# Patient Record
Sex: MALE | Race: WHITE | Employment: UNEMPLOYED | URBAN - METROPOLITAN AREA
[De-identification: names, ages, dates, MRNs, and addresses within clinical notes are randomized per-mention and may not be internally consistent; named-entity substitution may affect disease eponyms.]

---

## 2024-01-16 ENCOUNTER — HOSPITAL ENCOUNTER (INPATIENT)
Facility: HOSPITAL | Age: 39
LOS: 2 days | Discharge: HOME | DRG: 184 | End: 2024-01-18
Attending: SURGERY | Admitting: SURGERY
Payer: COMMERCIAL

## 2024-01-16 ENCOUNTER — APPOINTMENT (OUTPATIENT)
Dept: RADIOLOGY | Facility: HOSPITAL | Age: 39
End: 2024-01-16

## 2024-01-16 ENCOUNTER — APPOINTMENT (INPATIENT)
Dept: RADIOLOGY | Facility: HOSPITAL | Age: 39
DRG: 184 | End: 2024-01-16
Payer: COMMERCIAL

## 2024-01-16 ENCOUNTER — APPOINTMENT (EMERGENCY)
Dept: CT IMAGING | Facility: HOSPITAL | Age: 39
DRG: 184 | End: 2024-01-16
Payer: COMMERCIAL

## 2024-01-16 ENCOUNTER — APPOINTMENT (EMERGENCY)
Dept: RADIOLOGY | Facility: HOSPITAL | Age: 39
DRG: 184 | End: 2024-01-16
Payer: COMMERCIAL
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
## 2024-01-16 DIAGNOSIS — S69.92XA LEFT WRIST INJURY, INITIAL ENCOUNTER: ICD-10-CM

## 2024-01-16 DIAGNOSIS — S49.92XA INJURY OF LEFT SHOULDER, INITIAL ENCOUNTER: ICD-10-CM

## 2024-01-16 DIAGNOSIS — F10.10 ALCOHOL ABUSE: ICD-10-CM

## 2024-01-16 DIAGNOSIS — S02.5XXA BROKEN TEETH: ICD-10-CM

## 2024-01-16 PROBLEM — S27.321A RIGHT PULMONARY CONTUSION: Status: ACTIVE | Noted: 2024-01-16

## 2024-01-16 PROBLEM — E87.20 LACTIC ACIDOSIS: Status: ACTIVE | Noted: 2024-01-16

## 2024-01-16 PROBLEM — S22.41XA CLOSED FRACTURE OF MULTIPLE RIBS OF RIGHT SIDE: Status: ACTIVE | Noted: 2024-01-16

## 2024-01-16 LAB
2HR DELTA HS TROPONIN: -12 NG/L
ABO GROUP BLD: NORMAL
ABO GROUP BLD: NORMAL
ANION GAP SERPL CALCULATED.3IONS-SCNC: 21 MMOL/L
APAP SERPL-MCNC: <2 UG/ML (ref 10–20)
BASE EXCESS BLDA CALC-SCNC: -2 MMOL/L (ref -2–3)
BASOPHILS # BLD AUTO: 0.08 THOUSANDS/ÂΜL (ref 0–0.1)
BASOPHILS NFR BLD AUTO: 1 % (ref 0–1)
BLD GP AB SCN SERPL QL: NEGATIVE
BUN SERPL-MCNC: 10 MG/DL (ref 5–25)
CA-I BLD-SCNC: 1.09 MMOL/L (ref 1.12–1.32)
CALCIUM SERPL-MCNC: 9.6 MG/DL (ref 8.4–10.2)
CARDIAC TROPONIN I PNL SERPL HS: 10 NG/L
CARDIAC TROPONIN I PNL SERPL HS: 22 NG/L
CHLORIDE SERPL-SCNC: 96 MMOL/L (ref 96–108)
CO2 SERPL-SCNC: 19 MMOL/L (ref 21–32)
CREAT SERPL-MCNC: 0.84 MG/DL (ref 0.6–1.3)
EOSINOPHIL # BLD AUTO: 0.06 THOUSAND/ÂΜL (ref 0–0.61)
EOSINOPHIL NFR BLD AUTO: 1 % (ref 0–6)
ERYTHROCYTE [DISTWIDTH] IN BLOOD BY AUTOMATED COUNT: 12.6 % (ref 11.6–15.1)
ETHANOL SERPL-MCNC: <10 MG/DL
GFR SERPL CREATININE-BSD FRML MDRD: 110 ML/MIN/1.73SQ M
GLUCOSE SERPL-MCNC: 137 MG/DL (ref 65–140)
GLUCOSE SERPL-MCNC: 141 MG/DL (ref 65–140)
HCO3 BLDA-SCNC: 21.6 MMOL/L (ref 24–30)
HCT VFR BLD AUTO: 42.4 % (ref 36.5–49.3)
HCT VFR BLD CALC: 45 % (ref 36.5–49.3)
HGB BLD-MCNC: 14.6 G/DL (ref 12–17)
HGB BLDA-MCNC: 15.3 G/DL (ref 12–17)
IMM GRANULOCYTES # BLD AUTO: 0.18 THOUSAND/UL (ref 0–0.2)
IMM GRANULOCYTES NFR BLD AUTO: 2 % (ref 0–2)
LACTATE SERPL-SCNC: 1.2 MMOL/L (ref 0.5–2)
LACTATE SERPL-SCNC: 8.5 MMOL/L (ref 0.5–2)
LYMPHOCYTES # BLD AUTO: 1.23 THOUSANDS/ÂΜL (ref 0.6–4.47)
LYMPHOCYTES NFR BLD AUTO: 13 % (ref 14–44)
MCH RBC QN AUTO: 35.7 PG (ref 26.8–34.3)
MCHC RBC AUTO-ENTMCNC: 34.4 G/DL (ref 31.4–37.4)
MCV RBC AUTO: 104 FL (ref 82–98)
MONOCYTES # BLD AUTO: 0.57 THOUSAND/ÂΜL (ref 0.17–1.22)
MONOCYTES NFR BLD AUTO: 6 % (ref 4–12)
NEUTROPHILS # BLD AUTO: 7.56 THOUSANDS/ÂΜL (ref 1.85–7.62)
NEUTS SEG NFR BLD AUTO: 77 % (ref 43–75)
NRBC BLD AUTO-RTO: 0 /100 WBCS
PCO2 BLD: 23 MMOL/L (ref 21–32)
PCO2 BLD: 33.2 MM HG (ref 42–50)
PH BLD: 7.42 [PH] (ref 7.3–7.4)
PLATELET # BLD AUTO: 101 THOUSANDS/UL (ref 149–390)
PLATELET # BLD AUTO: 111 THOUSANDS/UL (ref 149–390)
PMV BLD AUTO: 10.6 FL (ref 8.9–12.7)
PMV BLD AUTO: 11.6 FL (ref 8.9–12.7)
PO2 BLD: 21 MM HG (ref 35–45)
POTASSIUM BLD-SCNC: 4.4 MMOL/L (ref 3.5–5.3)
POTASSIUM SERPL-SCNC: 4.2 MMOL/L (ref 3.5–5.3)
RBC # BLD AUTO: 4.09 MILLION/UL (ref 3.88–5.62)
RH BLD: POSITIVE
RH BLD: POSITIVE
SALICYLATES SERPL-MCNC: <5 MG/DL (ref 3–20)
SAO2 % BLD FROM PO2: 36 % (ref 60–85)
SODIUM BLD-SCNC: 136 MMOL/L (ref 136–145)
SODIUM SERPL-SCNC: 136 MMOL/L (ref 135–147)
SPECIMEN EXPIRATION DATE: NORMAL
SPECIMEN SOURCE: ABNORMAL
WBC # BLD AUTO: 9.68 THOUSAND/UL (ref 4.31–10.16)

## 2024-01-16 PROCEDURE — 85049 AUTOMATED PLATELET COUNT: CPT | Performed by: SURGERY

## 2024-01-16 PROCEDURE — G1004 CDSM NDSC: HCPCS

## 2024-01-16 PROCEDURE — 70450 CT HEAD/BRAIN W/O DYE: CPT

## 2024-01-16 PROCEDURE — 80048 BASIC METABOLIC PNL TOTAL CA: CPT | Performed by: SURGERY

## 2024-01-16 PROCEDURE — 73610 X-RAY EXAM OF ANKLE: CPT

## 2024-01-16 PROCEDURE — 71045 X-RAY EXAM CHEST 1 VIEW: CPT

## 2024-01-16 PROCEDURE — 96375 TX/PRO/DX INJ NEW DRUG ADDON: CPT

## 2024-01-16 PROCEDURE — 82330 ASSAY OF CALCIUM: CPT

## 2024-01-16 PROCEDURE — 99285 EMERGENCY DEPT VISIT HI MDM: CPT

## 2024-01-16 PROCEDURE — 71260 CT THORAX DX C+: CPT

## 2024-01-16 PROCEDURE — 84132 ASSAY OF SERUM POTASSIUM: CPT

## 2024-01-16 PROCEDURE — 73080 X-RAY EXAM OF ELBOW: CPT

## 2024-01-16 PROCEDURE — 96365 THER/PROPH/DIAG IV INF INIT: CPT

## 2024-01-16 PROCEDURE — 86900 BLOOD TYPING SEROLOGIC ABO: CPT | Performed by: SURGERY

## 2024-01-16 PROCEDURE — 36415 COLL VENOUS BLD VENIPUNCTURE: CPT | Performed by: SURGERY

## 2024-01-16 PROCEDURE — 86850 RBC ANTIBODY SCREEN: CPT | Performed by: SURGERY

## 2024-01-16 PROCEDURE — 82803 BLOOD GASES ANY COMBINATION: CPT

## 2024-01-16 PROCEDURE — 84484 ASSAY OF TROPONIN QUANT: CPT | Performed by: SURGERY

## 2024-01-16 PROCEDURE — 82947 ASSAY GLUCOSE BLOOD QUANT: CPT

## 2024-01-16 PROCEDURE — 80179 DRUG ASSAY SALICYLATE: CPT | Performed by: SURGERY

## 2024-01-16 PROCEDURE — 85025 COMPLETE CBC W/AUTO DIFF WBC: CPT | Performed by: SURGERY

## 2024-01-16 PROCEDURE — 0HQEXZZ REPAIR LEFT LOWER ARM SKIN, EXTERNAL APPROACH: ICD-10-PCS | Performed by: SURGERY

## 2024-01-16 PROCEDURE — 0HQCXZZ REPAIR LEFT UPPER ARM SKIN, EXTERNAL APPROACH: ICD-10-PCS | Performed by: SURGERY

## 2024-01-16 PROCEDURE — 73110 X-RAY EXAM OF WRIST: CPT

## 2024-01-16 PROCEDURE — 73590 X-RAY EXAM OF LOWER LEG: CPT

## 2024-01-16 PROCEDURE — 86901 BLOOD TYPING SEROLOGIC RH(D): CPT | Performed by: SURGERY

## 2024-01-16 PROCEDURE — NC001 PR NO CHARGE: Performed by: SURGERY

## 2024-01-16 PROCEDURE — 93005 ELECTROCARDIOGRAM TRACING: CPT

## 2024-01-16 PROCEDURE — 84295 ASSAY OF SERUM SODIUM: CPT

## 2024-01-16 PROCEDURE — EDAIR PR ED AIR: Performed by: EMERGENCY MEDICINE

## 2024-01-16 PROCEDURE — 82077 ASSAY SPEC XCP UR&BREATH IA: CPT | Performed by: SURGERY

## 2024-01-16 PROCEDURE — 74177 CT ABD & PELVIS W/CONTRAST: CPT

## 2024-01-16 PROCEDURE — 73200 CT UPPER EXTREMITY W/O DYE: CPT

## 2024-01-16 PROCEDURE — 72125 CT NECK SPINE W/O DYE: CPT

## 2024-01-16 PROCEDURE — 83605 ASSAY OF LACTIC ACID: CPT | Performed by: SURGERY

## 2024-01-16 PROCEDURE — 80143 DRUG ASSAY ACETAMINOPHEN: CPT | Performed by: SURGERY

## 2024-01-16 PROCEDURE — 85014 HEMATOCRIT: CPT

## 2024-01-16 RX ORDER — HYDROMORPHONE HCL/PF 1 MG/ML
0.5 SYRINGE (ML) INJECTION EVERY 4 HOURS PRN
Status: DISCONTINUED | OUTPATIENT
Start: 2024-01-16 | End: 2024-01-18

## 2024-01-16 RX ORDER — METOCLOPRAMIDE HYDROCHLORIDE 5 MG/ML
10 INJECTION INTRAMUSCULAR; INTRAVENOUS EVERY 6 HOURS PRN
Status: DISCONTINUED | OUTPATIENT
Start: 2024-01-16 | End: 2024-01-18 | Stop reason: HOSPADM

## 2024-01-16 RX ORDER — ENOXAPARIN SODIUM 100 MG/ML
30 INJECTION SUBCUTANEOUS EVERY 12 HOURS
Status: DISCONTINUED | OUTPATIENT
Start: 2024-01-16 | End: 2024-01-17

## 2024-01-16 RX ORDER — METOCLOPRAMIDE HYDROCHLORIDE 5 MG/ML
10 INJECTION INTRAMUSCULAR; INTRAVENOUS ONCE
Status: COMPLETED | OUTPATIENT
Start: 2024-01-16 | End: 2024-01-16

## 2024-01-16 RX ORDER — OXYCODONE HYDROCHLORIDE 10 MG/1
10 TABLET ORAL EVERY 4 HOURS PRN
Status: DISCONTINUED | OUTPATIENT
Start: 2024-01-16 | End: 2024-01-18 | Stop reason: HOSPADM

## 2024-01-16 RX ORDER — LANOLIN ALCOHOL/MO/W.PET/CERES
100 CREAM (GRAM) TOPICAL DAILY
Status: DISCONTINUED | OUTPATIENT
Start: 2024-01-17 | End: 2024-01-18 | Stop reason: HOSPADM

## 2024-01-16 RX ORDER — ACETAMINOPHEN 325 MG/1
975 TABLET ORAL EVERY 8 HOURS SCHEDULED
Status: DISCONTINUED | OUTPATIENT
Start: 2024-01-16 | End: 2024-01-18 | Stop reason: HOSPADM

## 2024-01-16 RX ORDER — ONDANSETRON 2 MG/ML
4 INJECTION INTRAMUSCULAR; INTRAVENOUS ONCE AS NEEDED
Status: COMPLETED | OUTPATIENT
Start: 2024-01-16 | End: 2024-01-16

## 2024-01-16 RX ORDER — METHOCARBAMOL 500 MG/1
500 TABLET, FILM COATED ORAL EVERY 6 HOURS SCHEDULED
Status: DISCONTINUED | OUTPATIENT
Start: 2024-01-16 | End: 2024-01-18 | Stop reason: HOSPADM

## 2024-01-16 RX ORDER — CEFAZOLIN SODIUM 2 G/50ML
2000 SOLUTION INTRAVENOUS EVERY 8 HOURS
Status: COMPLETED | OUTPATIENT
Start: 2024-01-17 | End: 2024-01-17

## 2024-01-16 RX ORDER — CEFAZOLIN SODIUM 2 G/50ML
2000 SOLUTION INTRAVENOUS EVERY 8 HOURS
Status: DISCONTINUED | OUTPATIENT
Start: 2024-01-16 | End: 2024-01-16 | Stop reason: SDUPTHER

## 2024-01-16 RX ORDER — CEFAZOLIN SODIUM 2 G/50ML
2000 SOLUTION INTRAVENOUS ONCE
Status: COMPLETED | OUTPATIENT
Start: 2024-01-16 | End: 2024-01-16

## 2024-01-16 RX ORDER — LIDOCAINE HYDROCHLORIDE 10 MG/ML
10 INJECTION, SOLUTION EPIDURAL; INFILTRATION; INTRACAUDAL; PERINEURAL ONCE
Status: COMPLETED | OUTPATIENT
Start: 2024-01-16 | End: 2024-01-16

## 2024-01-16 RX ORDER — OXYCODONE HYDROCHLORIDE 5 MG/1
5 TABLET ORAL EVERY 4 HOURS PRN
Status: DISCONTINUED | OUTPATIENT
Start: 2024-01-16 | End: 2024-01-18 | Stop reason: HOSPADM

## 2024-01-16 RX ORDER — FENTANYL CITRATE 50 UG/ML
50 INJECTION, SOLUTION INTRAMUSCULAR; INTRAVENOUS ONCE
Status: COMPLETED | OUTPATIENT
Start: 2024-01-16 | End: 2024-01-16

## 2024-01-16 RX ORDER — LIDOCAINE 50 MG/G
1 PATCH TOPICAL DAILY
Status: DISCONTINUED | OUTPATIENT
Start: 2024-01-16 | End: 2024-01-18 | Stop reason: HOSPADM

## 2024-01-16 RX ORDER — FOLIC ACID 1 MG/1
1 TABLET ORAL DAILY
Status: DISCONTINUED | OUTPATIENT
Start: 2024-01-17 | End: 2024-01-18 | Stop reason: HOSPADM

## 2024-01-16 RX ORDER — SODIUM CHLORIDE, SODIUM GLUCONATE, SODIUM ACETATE, POTASSIUM CHLORIDE, MAGNESIUM CHLORIDE, SODIUM PHOSPHATE, DIBASIC, AND POTASSIUM PHOSPHATE .53; .5; .37; .037; .03; .012; .00082 G/100ML; G/100ML; G/100ML; G/100ML; G/100ML; G/100ML; G/100ML
1000 INJECTION, SOLUTION INTRAVENOUS ONCE
Status: COMPLETED | OUTPATIENT
Start: 2024-01-16 | End: 2024-01-16

## 2024-01-16 RX ORDER — GABAPENTIN 100 MG/1
100 CAPSULE ORAL 3 TIMES DAILY
Status: DISCONTINUED | OUTPATIENT
Start: 2024-01-16 | End: 2024-01-18 | Stop reason: HOSPADM

## 2024-01-16 RX ORDER — CHLORHEXIDINE GLUCONATE ORAL RINSE 1.2 MG/ML
15 SOLUTION DENTAL EVERY 12 HOURS SCHEDULED
Status: DISCONTINUED | OUTPATIENT
Start: 2024-01-16 | End: 2024-01-18 | Stop reason: HOSPADM

## 2024-01-16 RX ADMIN — CEFAZOLIN SODIUM 2000 MG: 2 SOLUTION INTRAVENOUS at 18:34

## 2024-01-16 RX ADMIN — FENTANYL CITRATE 50 MCG: 50 INJECTION INTRAMUSCULAR; INTRAVENOUS at 18:34

## 2024-01-16 RX ADMIN — PHENOBARBITAL SODIUM 260 MG: 65 INJECTION INTRAMUSCULAR at 21:55

## 2024-01-16 RX ADMIN — METOCLOPRAMIDE 10 MG: 5 INJECTION, SOLUTION INTRAMUSCULAR; INTRAVENOUS at 19:11

## 2024-01-16 RX ADMIN — ONDANSETRON 4 MG: 2 INJECTION INTRAMUSCULAR; INTRAVENOUS at 18:30

## 2024-01-16 RX ADMIN — LIDOCAINE HYDROCHLORIDE 10 ML: 10 INJECTION, SOLUTION EPIDURAL; INFILTRATION; INTRACAUDAL; PERINEURAL at 21:14

## 2024-01-16 RX ADMIN — GABAPENTIN 100 MG: 100 CAPSULE ORAL at 21:04

## 2024-01-16 RX ADMIN — METOCLOPRAMIDE 10 MG: 5 INJECTION, SOLUTION INTRAMUSCULAR; INTRAVENOUS at 21:04

## 2024-01-16 RX ADMIN — LIDOCAINE 5% 1 PATCH: 700 PATCH TOPICAL at 21:06

## 2024-01-16 RX ADMIN — ACETAMINOPHEN 975 MG: 325 TABLET, FILM COATED ORAL at 21:07

## 2024-01-16 RX ADMIN — CHLORHEXIDINE GLUCONATE 15 ML: 1.2 SOLUTION ORAL at 22:42

## 2024-01-16 RX ADMIN — SODIUM CHLORIDE, SODIUM GLUCONATE, SODIUM ACETATE, POTASSIUM CHLORIDE, MAGNESIUM CHLORIDE, SODIUM PHOSPHATE, DIBASIC, AND POTASSIUM PHOSPHATE 1000 ML: .53; .5; .37; .037; .03; .012; .00082 INJECTION, SOLUTION INTRAVENOUS at 21:10

## 2024-01-16 RX ADMIN — IOHEXOL 100 ML: 350 INJECTION, SOLUTION INTRAVENOUS at 18:50

## 2024-01-16 RX ADMIN — METHOCARBAMOL TABLETS 500 MG: 500 TABLET, COATED ORAL at 21:04

## 2024-01-16 RX ADMIN — ENOXAPARIN SODIUM 30 MG: 30 INJECTION SUBCUTANEOUS at 21:03
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
## 2024-01-17 ENCOUNTER — APPOINTMENT (INPATIENT)
Dept: RADIOLOGY | Facility: HOSPITAL | Age: 39
DRG: 184 | End: 2024-01-17
Payer: COMMERCIAL

## 2024-01-17 ENCOUNTER — APPOINTMENT (INPATIENT)
Dept: CT IMAGING | Facility: HOSPITAL | Age: 39
DRG: 184 | End: 2024-01-17
Payer: COMMERCIAL

## 2024-01-17 PROBLEM — G89.11 ACUTE PAIN DUE TO TRAUMA: Status: ACTIVE | Noted: 2024-01-17

## 2024-01-17 PROBLEM — E87.20 LACTIC ACIDOSIS: Status: RESOLVED | Noted: 2024-01-16 | Resolved: 2024-01-17

## 2024-01-17 LAB
4HR DELTA HS TROPONIN: -15 NG/L
ALBUMIN SERPL BCP-MCNC: 4.2 G/DL (ref 3.5–5)
ALP SERPL-CCNC: 50 U/L (ref 34–104)
ALT SERPL W P-5'-P-CCNC: 225 U/L (ref 7–52)
ANION GAP SERPL CALCULATED.3IONS-SCNC: 11 MMOL/L
APTT PPP: 30 SECONDS (ref 23–37)
AST SERPL W P-5'-P-CCNC: 260 U/L (ref 13–39)
BASOPHILS # BLD AUTO: 0.08 THOUSANDS/ÂΜL (ref 0–0.1)
BASOPHILS NFR BLD AUTO: 1 % (ref 0–1)
BILIRUB SERPL-MCNC: 1.19 MG/DL (ref 0.2–1)
BUN SERPL-MCNC: 10 MG/DL (ref 5–25)
CA-I BLD-SCNC: 1.04 MMOL/L (ref 1.12–1.32)
CALCIUM SERPL-MCNC: 8.4 MG/DL (ref 8.4–10.2)
CARDIAC TROPONIN I PNL SERPL HS: 7 NG/L
CHLORIDE SERPL-SCNC: 99 MMOL/L (ref 96–108)
CK SERPL-CCNC: 649 U/L (ref 39–308)
CO2 SERPL-SCNC: 25 MMOL/L (ref 21–32)
CREAT SERPL-MCNC: 0.71 MG/DL (ref 0.6–1.3)
EOSINOPHIL # BLD AUTO: 0 THOUSAND/ÂΜL (ref 0–0.61)
EOSINOPHIL NFR BLD AUTO: 0 % (ref 0–6)
ERYTHROCYTE [DISTWIDTH] IN BLOOD BY AUTOMATED COUNT: 12.4 % (ref 11.6–15.1)
GFR SERPL CREATININE-BSD FRML MDRD: 118 ML/MIN/1.73SQ M
GLUCOSE SERPL-MCNC: 93 MG/DL (ref 65–140)
HCT VFR BLD AUTO: 35 % (ref 36.5–49.3)
HGB BLD-MCNC: 12 G/DL (ref 12–17)
IMM GRANULOCYTES # BLD AUTO: 0.06 THOUSAND/UL (ref 0–0.2)
IMM GRANULOCYTES NFR BLD AUTO: 0 % (ref 0–2)
INR PPP: 1.13 (ref 0.84–1.19)
LYMPHOCYTES # BLD AUTO: 1.07 THOUSANDS/ÂΜL (ref 0.6–4.47)
LYMPHOCYTES NFR BLD AUTO: 8 % (ref 14–44)
MAGNESIUM SERPL-MCNC: 1.9 MG/DL (ref 1.9–2.7)
MCH RBC QN AUTO: 35.1 PG (ref 26.8–34.3)
MCHC RBC AUTO-ENTMCNC: 34.3 G/DL (ref 31.4–37.4)
MCV RBC AUTO: 102 FL (ref 82–98)
MONOCYTES # BLD AUTO: 1.32 THOUSAND/ÂΜL (ref 0.17–1.22)
MONOCYTES NFR BLD AUTO: 10 % (ref 4–12)
NEUTROPHILS # BLD AUTO: 10.84 THOUSANDS/ÂΜL (ref 1.85–7.62)
NEUTS SEG NFR BLD AUTO: 81 % (ref 43–75)
NRBC BLD AUTO-RTO: 0 /100 WBCS
PHOSPHATE SERPL-MCNC: 3.7 MG/DL (ref 2.7–4.5)
PLATELET # BLD AUTO: 97 THOUSANDS/UL (ref 149–390)
PLATELET BLD QL SMEAR: ABNORMAL
PMV BLD AUTO: 10.8 FL (ref 8.9–12.7)
POTASSIUM SERPL-SCNC: 3.4 MMOL/L (ref 3.5–5.3)
PROT SERPL-MCNC: 6.8 G/DL (ref 6.4–8.4)
PROTHROMBIN TIME: 15.2 SECONDS (ref 11.6–14.5)
RBC # BLD AUTO: 3.42 MILLION/UL (ref 3.88–5.62)
RBC MORPH BLD: NORMAL
SODIUM SERPL-SCNC: 135 MMOL/L (ref 135–147)
WBC # BLD AUTO: 13.37 THOUSAND/UL (ref 4.31–10.16)

## 2024-01-17 PROCEDURE — 97116 GAIT TRAINING THERAPY: CPT

## 2024-01-17 PROCEDURE — 97163 PT EVAL HIGH COMPLEX 45 MIN: CPT

## 2024-01-17 PROCEDURE — 85730 THROMBOPLASTIN TIME PARTIAL: CPT | Performed by: SURGERY

## 2024-01-17 PROCEDURE — G1004 CDSM NDSC: HCPCS

## 2024-01-17 PROCEDURE — 83735 ASSAY OF MAGNESIUM: CPT | Performed by: SURGERY

## 2024-01-17 PROCEDURE — 97760 ORTHOTIC MGMT&TRAING 1ST ENC: CPT

## 2024-01-17 PROCEDURE — 82550 ASSAY OF CK (CPK): CPT | Performed by: SURGERY

## 2024-01-17 PROCEDURE — 71045 X-RAY EXAM CHEST 1 VIEW: CPT

## 2024-01-17 PROCEDURE — 70486 CT MAXILLOFACIAL W/O DYE: CPT

## 2024-01-17 PROCEDURE — 85610 PROTHROMBIN TIME: CPT | Performed by: SURGERY

## 2024-01-17 PROCEDURE — 85025 COMPLETE CBC W/AUTO DIFF WBC: CPT | Performed by: SURGERY

## 2024-01-17 PROCEDURE — 82330 ASSAY OF CALCIUM: CPT | Performed by: SURGERY

## 2024-01-17 PROCEDURE — 92610 EVALUATE SWALLOWING FUNCTION: CPT

## 2024-01-17 PROCEDURE — 97167 OT EVAL HIGH COMPLEX 60 MIN: CPT

## 2024-01-17 PROCEDURE — 73630 X-RAY EXAM OF FOOT: CPT

## 2024-01-17 PROCEDURE — 84100 ASSAY OF PHOSPHORUS: CPT | Performed by: SURGERY

## 2024-01-17 PROCEDURE — 97129 THER IVNTJ 1ST 15 MIN: CPT

## 2024-01-17 PROCEDURE — 80053 COMPREHEN METABOLIC PANEL: CPT | Performed by: SURGERY

## 2024-01-17 RX ORDER — MAGNESIUM SULFATE HEPTAHYDRATE 40 MG/ML
2 INJECTION, SOLUTION INTRAVENOUS ONCE
Status: COMPLETED | OUTPATIENT
Start: 2024-01-17 | End: 2024-01-18

## 2024-01-17 RX ORDER — CALCIUM GLUCONATE 20 MG/ML
2 INJECTION, SOLUTION INTRAVENOUS ONCE
Status: COMPLETED | OUTPATIENT
Start: 2024-01-17 | End: 2024-01-18

## 2024-01-17 RX ORDER — POTASSIUM CHLORIDE 20 MEQ/1
40 TABLET, EXTENDED RELEASE ORAL ONCE
Status: COMPLETED | OUTPATIENT
Start: 2024-01-17 | End: 2024-01-17

## 2024-01-17 RX ORDER — ENOXAPARIN SODIUM 100 MG/ML
30 INJECTION SUBCUTANEOUS EVERY 12 HOURS SCHEDULED
Status: DISCONTINUED | OUTPATIENT
Start: 2024-01-17 | End: 2024-01-18 | Stop reason: HOSPADM

## 2024-01-17 RX ADMIN — GABAPENTIN 100 MG: 100 CAPSULE ORAL at 08:38

## 2024-01-17 RX ADMIN — ENOXAPARIN SODIUM 30 MG: 30 INJECTION SUBCUTANEOUS at 21:13

## 2024-01-17 RX ADMIN — CEFAZOLIN SODIUM 2000 MG: 2 SOLUTION INTRAVENOUS at 01:26

## 2024-01-17 RX ADMIN — POTASSIUM CHLORIDE 40 MEQ: 1500 TABLET, EXTENDED RELEASE ORAL at 08:38

## 2024-01-17 RX ADMIN — OXYCODONE HYDROCHLORIDE 10 MG: 10 TABLET ORAL at 18:48

## 2024-01-17 RX ADMIN — LIDOCAINE 5% 1 PATCH: 700 PATCH TOPICAL at 08:45

## 2024-01-17 RX ADMIN — OXYCODONE HYDROCHLORIDE 10 MG: 10 TABLET ORAL at 00:30

## 2024-01-17 RX ADMIN — GABAPENTIN 100 MG: 100 CAPSULE ORAL at 21:13

## 2024-01-17 RX ADMIN — ACETAMINOPHEN 975 MG: 325 TABLET, FILM COATED ORAL at 21:13

## 2024-01-17 RX ADMIN — HYDROMORPHONE HYDROCHLORIDE 0.5 MG: 1 INJECTION, SOLUTION INTRAMUSCULAR; INTRAVENOUS; SUBCUTANEOUS at 01:26

## 2024-01-17 RX ADMIN — AMPICILLIN AND SULBACTAM 3 G: 100; 50 INJECTION, POWDER, FOR SOLUTION INTRAVENOUS at 18:48

## 2024-01-17 RX ADMIN — OXYCODONE HYDROCHLORIDE 10 MG: 10 TABLET ORAL at 04:58

## 2024-01-17 RX ADMIN — ACETAMINOPHEN 975 MG: 325 TABLET, FILM COATED ORAL at 05:00

## 2024-01-17 RX ADMIN — METHOCARBAMOL TABLETS 500 MG: 500 TABLET, COATED ORAL at 02:27

## 2024-01-17 RX ADMIN — METHOCARBAMOL TABLETS 500 MG: 500 TABLET, COATED ORAL at 08:46

## 2024-01-17 RX ADMIN — METHOCARBAMOL TABLETS 500 MG: 500 TABLET, COATED ORAL at 21:13

## 2024-01-17 RX ADMIN — AMPICILLIN AND SULBACTAM 3 G: 100; 50 INJECTION, POWDER, FOR SOLUTION INTRAVENOUS at 13:36

## 2024-01-17 RX ADMIN — CALCIUM GLUCONATE 2 G: 20 INJECTION, SOLUTION INTRAVENOUS at 08:38

## 2024-01-17 RX ADMIN — ACETAMINOPHEN 975 MG: 325 TABLET, FILM COATED ORAL at 13:04

## 2024-01-17 RX ADMIN — MULTIPLE VITAMINS W/ MINERALS TAB 1 TABLET: TAB ORAL at 08:38

## 2024-01-17 RX ADMIN — Medication 100 MG: at 08:38

## 2024-01-17 RX ADMIN — MAGNESIUM SULFATE HEPTAHYDRATE 2 G: 40 INJECTION, SOLUTION INTRAVENOUS at 08:37

## 2024-01-17 RX ADMIN — GABAPENTIN 100 MG: 100 CAPSULE ORAL at 15:37

## 2024-01-17 RX ADMIN — FOLIC ACID 1 MG: 1 TABLET ORAL at 08:45

## 2024-01-17 RX ADMIN — METHOCARBAMOL TABLETS 500 MG: 500 TABLET, COATED ORAL at 13:04

## 2024-01-17 RX ADMIN — OXYCODONE HYDROCHLORIDE 10 MG: 10 TABLET ORAL at 08:38

## 2024-01-17 RX ADMIN — OXYCODONE HYDROCHLORIDE 10 MG: 10 TABLET ORAL at 12:40

## 2024-01-17 RX ADMIN — ENOXAPARIN SODIUM 30 MG: 30 INJECTION SUBCUTANEOUS at 11:16

## 2024-01-17 RX ADMIN — CEFAZOLIN SODIUM 2000 MG: 2 SOLUTION INTRAVENOUS at 10:29

## 2024-01-17 RX ADMIN — CHLORHEXIDINE GLUCONATE 15 ML: 1.2 SOLUTION ORAL at 21:13

## 2024-01-17 RX ADMIN — CHLORHEXIDINE GLUCONATE 15 ML: 1.2 SOLUTION ORAL at 08:38

## 2024-01-17 NOTE — PROCEDURES
Splint application    Date/Time: 1/16/2024 10:25 PM    Performed by: Wilbert Kaba PA-C  Authorized by: Wilbert Kaba PA-C  Universal Protocol:  Consent: Verbal consent obtained.  Consent given by: patient  Patient understanding: patient states understanding of the procedure being performed    Procedure details:     Laterality:  Left    Location:  Wrist    Wrist:  L wrist    Splint type:  Volar short arm    Supplies:  Cotton padding, fiberglass and elastic bandage

## 2024-01-17 NOTE — PLAN OF CARE
Problem: PAIN - ADULT  Goal: Verbalizes/displays adequate comfort level or baseline comfort level  Description: Interventions:  - Encourage patient to monitor pain and request assistance  - Assess pain using appropriate pain scale  - Administer analgesics based on type and severity of pain and evaluate response  - Implement non-pharmacological measures as appropriate and evaluate response  - Consider cultural and social influences on pain and pain management  - Notify physician/advanced practitioner if interventions unsuccessful or patient reports new pain  Outcome: Progressing     Problem: INFECTION - ADULT  Goal: Absence or prevention of progression during hospitalization  Description: INTERVENTIONS:  - Assess and monitor for signs and symptoms of infection  - Monitor lab/diagnostic results  - Monitor all insertion sites, i.e. indwelling lines, tubes, and drains  - Monitor endotracheal if appropriate and nasal secretions for changes in amount and color  - [place] appropriate cooling/warming therapies per order  - Administer medications as ordered  - Instruct and encourage patient and family to use good hand hygiene technique  - Identify and instruct in appropriate isolation precautions for identified infection/condition  Outcome: Progressing  Goal: Absence of fever/infection during neutropenic period  Description: INTERVENTIONS:  - Monitor WBC    Outcome: Progressing     Problem: SAFETY ADULT  Goal: Patient will remain free of falls  Description: INTERVENTIONS:  - Educate patient/family on patient safety including physical limitations  - Instruct patient to call for assistance with activity   - Consult OT/PT to assist with strengthening/mobility   - Keep Call bell within reach  - Keep bed low and locked with side rails adjusted as appropriate  - Keep care items and personal belongings within reach  - Initiate and maintain comfort rounds  - Apply yellow socks and bracelet for high fall risk patients  - Consider  moving patient to room near nurses station  Outcome: Progressing  Goal: Maintain or return to baseline ADL function  Description: INTERVENTIONS:  -  Assess patient's ability to carry out ADLs; assess patient's baseline for ADL function and identify physical deficits which impact ability to perform ADLs (bathing, care of mouth/teeth, toileting, grooming, dressing, etc.)  - Assess/evaluate cause of self-care deficits   - Assess range of motion  - Assess patient's mobility; develop plan if impaired  - Assess patient's need for assistive devices and provide as appropriate  - Encourage maximum independence but intervene and supervise when necessary  - Involve family in performance of ADLs  - Assess for home care needs following discharge   - Consider OT consult to assist with ADL evaluation and planning for discharge  - Provide patient education as appropriate  Outcome: Progressing  Goal: Maintains/Returns to pre admission functional level  Description: INTERVENTIONS:  - Perform AM-PAC 6 Click Basic Mobility/ Daily Activity assessment daily.  - Set and communicate daily mobility goal to care team and patient/family/caregiver.   - Collaborate with rehabilitation services on mobility goals if consulted  - Record patient progress and toleration of activity level   Outcome: Progressing     Problem: DISCHARGE PLANNING  Goal: Discharge to home or other facility with appropriate resources  Description: INTERVENTIONS:  - Identify barriers to discharge w/patient and caregiver  - Arrange for needed discharge resources and transportation as appropriate  - Identify discharge learning needs (meds, wound care, etc.)  - Arrange for interpretive services to assist at discharge as needed  - Refer to Case Management Department for coordinating discharge planning if the patient needs post-hospital services based on physician/advanced practitioner order or complex needs related to functional status, cognitive ability, or social support  system  Outcome: Progressing     Problem: Knowledge Deficit  Goal: Patient/family/caregiver demonstrates understanding of disease process, treatment plan, medications, and discharge instructions  Description: Complete learning assessment and assess knowledge base.  Interventions:  - Provide teaching at level of understanding  - Provide teaching via preferred learning methods  Outcome: Progressing
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
## 2024-01-17 NOTE — ASSESSMENT & PLAN NOTE
- Multiple right-sided rib fractures (3-5, age indeterminate 7-9), present on admission.  - Continue rib fracture protocol.  - Continue to encourage incentive spirometer use and adequate pulmonary hygiene.    - PIC score .  - Appreciate APS evaluation and recommendations.  - Continue multimodal analgesic regimen.  - Supplemental oxygen via nasal cannula as needed to maintain saturations greater than or equal to 94%.  - Repeat chest x-ray 1/17  - PT and OT evaluation and treatment as indicated.  - Outpatient follow-up in the trauma clinic for re-evaluation in approximately 2 weeks.
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
## 2024-01-17 NOTE — ASSESSMENT & PLAN NOTE
- 2g ancef given in trauma bay for possible open wrist fracture  - XR Left wrist: Unusual bone densities dorsal to the distal radius. Cannot exclude acute fracture   - CT LUE: Small bony fragments immediately dorsal to the distal radius attributed to age-indeterminate displaced fracture   - Appreciate Orthopedics consult and recommendations  - Nonoperative  - Laceration washed out and repaired at bedside, volar splint placed  - NWB LUE in splint  - Complete open fx protocol with ancef 2g Q8 hrs x 3 doses   - Neurovascularly intact  - Multimodal pain regimen  - PT and OT

## 2024-01-17 NOTE — PROCEDURES
Universal Protocol:  Consent: Verbal consent obtained.  Consent given by: patient  Patient understanding: patient states understanding of the procedure being performed  Patient identity confirmed: verbally with patient  Laceration repair    Date/Time: 1/16/2024 10:19 PM    Performed by: Wilbert Kaba PA-C  Authorized by: Wilbert Kaba PA-C  Body area: upper extremity  Location details: left wrist  Laceration length: 4 cm  Anesthesia: local infiltration    Anesthesia:  Local Anesthetic: lidocaine 1% without epinephrine  Anesthetic total: 5 mL    Wound Dehiscence:  Superficial Wound Dehiscence: simple closure      Procedure Details:  Preparation: Patient was prepped and draped in the usual sterile fashion.  Irrigation solution: saline  Irrigation method: jet lavage  Amount of cleaning: extensive (2L sterile water bedside washout)  Wound skin closure material used: 2-0 Prolene.  Number of sutures: 5  Technique: simple  Approximation: close  Approximation difficulty: simple  Patient tolerance: patient tolerated the procedure well with no immediate complications  Comments: Xeroform, white gauze, and volar splint
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
## 2024-01-17 NOTE — ASSESSMENT & PLAN NOTE
- 2g ancef given in trauma bay for possible open wrist fracture  - XR Left wrist: Unusual bone densities dorsal to the distal radius. Cannot exclude acute fracture   - CT LUE: Small bony fragments immediately dorsal to the distal radius attributed to age-indeterminate displaced fracture   - Appreciate Orthopedics consult and recommendations, nonoperative  - Laceration washed out and repaired at bedside, volar splint placed  - NWB LUE in splint  - Complete open fx protocol with ancef 2g Q8 hrs x 3 doses   - Neurovascularly intact  - Multimodal pain regimen  - PT and OT

## 2024-01-17 NOTE — ASSESSMENT & PLAN NOTE
- Small to moderate anterior right upper lobe and trace anterior right middle lobe pulmonary contusion.   - Repeat CXR in AM to evaluate for blossoming, pending  - Aggressive pulmonary hygiene, able to pull 1500 on IS.

## 2024-01-17 NOTE — DISCHARGE INSTR - AVS FIRST PAGE
Discharge Instructions - Orthopedics  Devin Gao 38 y.o. male MRN: [id number]  Unit/Bed#: AN CT SCAN    Weight Bearing Status:                                           Weight bearing as tolerated to the right lower extremity in camboot  Non weight bearing to the left upper extremity in volar resting splint.     Pain:  Continue analgesics as directed    Dressing Instructions:   Please keep clean, dry and intact until follow up     Appt Instructions:   If you do not have your appointment, please call the clinic at [phone] to schedule with Dr. Buckley.   Otherwise follow up as scheduled.    Contact the office sooner if you experience any increased numbness/tingling in the extremities.      Traumatic Rib Fracture Discharge Instructions:    Your rib fractures will take time to heal. Rib fractures typically take at least 6-8 weeks to heal and may take longer.    Activity:  - Walking and normal light activities are encouraged. Normal daily activities including climbing steps are okay.  - Avoid lifting greater than 10 pounds, any strenuous activities and/or exercise, and contact sports until cleared by the trauma service.  - Continue using the incentive spirometer at least 10 times every hour while awake.    Return to work:    - You may return to work once you are cleared by the trauma service.    Medications:    - You should continue your current medication regimen after discharge unless otherwise instructed. Please refer to your discharge medication list for further details.  - Please take the pain medications as directed.  - You are encouraged to use non-narcotic pain medications first and whenever possible. Reserve the use of narcotic pain medication for moderate to severe pain not controlled by non-narcotic medications.  - No driving while taking narcotic pain medications.  - You may become constipated, especially if taking pain medications. You may take any over the counter stool softeners or laxatives  as needed. Examples: Milk of Magnesia, Colace, Senna.    Additional Instructions:  - If you have any questions or concerns after discharge please call the office.  - Call office or return to ER if fever greater than 101, chills, worsening/uncontrollable pain, develop productive cough, increasing shortness of breath, and/or difficulty breathing.    Oral and Maxillofacial Surgery Discharge Instructions:

## 2024-01-17 NOTE — ASSESSMENT & PLAN NOTE
No current oxygen requirement.  Denies shortness of breath and does not appear in respiratory distress.  Able to pull greater than 2 L on incentive spirometry consistently in my presence.
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
## 2024-01-17 NOTE — ASSESSMENT & PLAN NOTE
- Right sided upper teeth are broken with associated buccal swelling, no identified lacerations in the oral mucosa  - OMS consulted, appreciate recs  - follow up CT facial bones prior to initiating unasyn. Will follow up with OMS once imaging completed.
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
## 2024-01-17 NOTE — ASSESSMENT & PLAN NOTE
- Reports he drinks 4 liquor drinks daily, last drink was 1/15  - History of alcohol withdrawal, unknown if he has seizures  - Started to have tremors/ shakiness and mild hypertension and tachycardia - gave 1 dose of phenobarbital 260 mg IV  - Monitor on [facility] protocol, SD2
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
## 2024-01-17 NOTE — PROCEDURES
Universal Protocol:  Consent: Verbal consent obtained.  Consent given by: patient  Patient understanding: patient states understanding of the procedure being performed  Patient identity confirmed: verbally with patient  Laceration repair    Date/Time: 1/16/2024 10:26 PM    Performed by: Wilbert Kaba PA-C  Authorized by: Wilbert Kaba PA-C  Body area: upper extremity  Location details: left shoulder  Laceration length: 1 cm    Anesthesia:  Local Anesthetic: lidocaine 1% without epinephrine    Wound Dehiscence:  Superficial Wound Dehiscence: simple closure      Procedure Details:  Wound skin closure material used: 2-0 prolene.  Number of sutures: 1  Patient tolerance: patient tolerated the procedure well with no immediate complications
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
## 2024-01-17 NOTE — PLAN OF CARE
Problem: PAIN - ADULT  Goal: Verbalizes/displays adequate comfort level or baseline comfort level  Description: Interventions:  - Encourage patient to monitor pain and request assistance  - Assess pain using appropriate pain scale  - Administer analgesics based on type and severity of pain and evaluate response  - Implement non-pharmacological measures as appropriate and evaluate response  - Consider cultural and social influences on pain and pain management  - Notify physician/advanced practitioner if interventions unsuccessful or patient reports new pain  Outcome: Progressing     Problem: INFECTION - ADULT  Goal: Absence or prevention of progression during hospitalization  Description: INTERVENTIONS:  - Assess and monitor for signs and symptoms of infection  - Monitor lab/diagnostic results  - Monitor all insertion sites, i.e. indwelling lines, tubes, and drains  - Monitor endotracheal if appropriate and nasal secretions for changes in amount and color  - [place] appropriate cooling/warming therapies per order  - Administer medications as ordered  - Instruct and encourage patient and family to use good hand hygiene technique  - Identify and instruct in appropriate isolation precautions for identified infection/condition  Outcome: Progressing  Goal: Absence of fever/infection during neutropenic period  Description: INTERVENTIONS:  - Monitor WBC    Outcome: Progressing     Problem: SAFETY ADULT  Goal: Patient will remain free of falls  Description: INTERVENTIONS:  - Educate patient/family on patient safety including physical limitations  - Instruct patient to call for assistance with activity   - Consult OT/PT to assist with strengthening/mobility   - Keep Call bell within reach  - Keep bed low and locked with side rails adjusted as appropriate  - Keep care items and personal belongings within reach  - Initiate and maintain comfort rounds  - Make Fall Risk Sign visible to staff  - Apply yellow socks and bracelet  for high fall risk patients  - Consider moving patient to room near nurses station  Outcome: Progressing  Goal: Maintain or return to baseline ADL function  Description: INTERVENTIONS:  -  Assess patient's ability to carry out ADLs; assess patient's baseline for ADL function and identify physical deficits which impact ability to perform ADLs (bathing, care of mouth/teeth, toileting, grooming, dressing, etc.)  - Assess/evaluate cause of self-care deficits   - Assess range of motion  - Assess patient's mobility; develop plan if impaired  - Assess patient's need for assistive devices and provide as appropriate  - Encourage maximum independence but intervene and supervise when necessary  - Involve family in performance of ADLs  - Assess for home care needs following discharge   - Consider OT consult to assist with ADL evaluation and planning for discharge  - Provide patient education as appropriate  Outcome: Progressing  Goal: Maintains/Returns to pre admission functional level  Description: INTERVENTIONS:  - Perform AM-PAC 6 Click Basic Mobility/ Daily Activity assessment daily.  - Set and communicate daily mobility goal to care team and patient/family/caregiver.   - Collaborate with rehabilitation services on mobility goals if consulted  - Out of bed for toileting  - Record patient progress and toleration of activity level   Outcome: Progressing     Problem: DISCHARGE PLANNING  Goal: Discharge to home or other facility with appropriate resources  Description: INTERVENTIONS:  - Identify barriers to discharge w/patient and caregiver  - Arrange for needed discharge resources and transportation as appropriate  - Identify discharge learning needs (meds, wound care, etc.)  - Arrange for interpretive services to assist at discharge as needed  - Refer to Case Management Department for coordinating discharge planning if the patient needs post-hospital services based on physician/advanced practitioner order or complex needs  related to functional status, cognitive ability, or social support system  Outcome: Progressing     Problem: Knowledge Deficit  Goal: Patient/family/caregiver demonstrates understanding of disease process, treatment plan, medications, and discharge instructions  Description: Complete learning assessment and assess knowledge base.  Interventions:  - Provide teaching at level of understanding  - Provide teaching via preferred learning methods  Outcome: Progressing

## 2024-01-17 NOTE — ASSESSMENT & PLAN NOTE
- Reports he drinks 4 liquor drinks daily, last drink was 1/15  - History of alcohol withdrawal, unknown if he has seizures  - thiamine, folate, multivitamin  - Started to have tremors/ shakiness and mild hypertension and tachycardia - pt has gotten phenobarbital 260 mg IV  - Monitor on [facility] protocol, SD2

## 2024-01-17 NOTE — ASSESSMENT & PLAN NOTE
- Right sided upper teeth are broken with associated buccal swelling, no identified lacerations in the oral mucosa  - OMS consulted

## 2024-01-17 NOTE — ASSESSMENT & PLAN NOTE
Tylenol 975 mg p.o. every 8 hours scheduled.  Gabapentin 100 mg p.o. 3 times daily.  Lidocaine patch, on for 12 hours and off for 12 hours.  Robaxin 500 mg p.o. every 6 hours scheduled.  Oxycodone 5 mg p.o. every 4 hours as needed moderate pain.  Oxycodone 10 mg p.o. every 4 hours.  Severe pain.  Discontinue IV Dilaudid.  Bowel regimen to avoid opioid-induced constipation while on opioid pain medication.  Ice to painful areas for up to 20 minutes every hour as needed.  No indication for epidural PCEA or peripheral nerve block at this time.  Did discuss this with patient and he is amenable should the need arise.  At discharge, suggest the following:  Tylenol 975 mg p.o. every 8 hours as needed mild pain.  Gabapentin 100 mg p.o. 3 times daily x 3 days, then 100 mg p.o. twice daily x 3 days, then 100 mg p.o. at bedtime x 3 days, then discontinue.  Lidocaine patch, on for 12 hours and off for 12 hours as needed for mild local pain.  Robaxin 500 mg p.o. every 6 hours as needed muscle spasm x 5 days.  Oxycodone 5 mg p.o. every 4 hours as needed moderate to severe pain x 3 days.  Bowel regimen while on opioid pain medication.

## 2024-01-17 NOTE — PROCEDURES
POC FAST US    Date/Time: 1/16/2024 7:08 PM    Performed by: Wilbert Kaba PA-C  Authorized by: Wilbert Kaba PA-C    Patient location:  Trauma  Procedure details:     Exam Type:  Diagnostic    Indications: blunt abdominal trauma and blunt chest trauma      Assess for:  Intra-abdominal fluid, pericardial effusion and pneumothorax    Technique: extended FAST      Views obtained:  Heart - Pericardial sac, LUQ - Splenorenal space, Suprapubic - Pouch of Mahesh, RUQ - Diana's Pouch, Left thorax and Right thorax    Image quality: diagnostic      Image availability:  Images available in PACS and video obtained  FAST Findings:     RUQ (Hepatorenal) free fluid: absent      LUQ (Splenorenal) free fluid: absent      Suprapubic free fluid: absent      Cardiac wall motion: identified      Pericardial effusion: absent    extended FAST (Pulmonary) findings:     Left lung sliding: Present      Right lung sliding: Present    Interpretation:     Impressions: negative

## 2024-01-17 NOTE — ASSESSMENT & PLAN NOTE
- Multiple right-sided rib fractures (3-5, age indeterminate 7-9), present on admission.  - Continue rib fracture protocol.  - Continue to encourage incentive spirometer use and adequate pulmonary hygiene.    - Appreciate APS evaluation and recommendations.  - Continue multimodal analgesic regimen.  - patient is tolerating incentive spirometery well, no epidural. Start chemical DVT ppx  - Supplemental oxygen via nasal cannula as needed to maintain saturations greater than or equal to 94%.  - Repeat chest x-ray 1/17  - PT and OT evaluation and treatment as indicated.  - Outpatient follow-up in the trauma clinic for re-evaluation in approximately 2 weeks.

## 2024-01-17 NOTE — ASSESSMENT & PLAN NOTE
- Small to moderate anterior right upper lobe and trace anterior right middle lobe pulmonary contusion.   - Repeat CXR in AM to evaluate for blossoming  - Aggressive pulmonary hygiene
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
## 2024-01-17 NOTE — OCCUPATIONAL THERAPY NOTE
Occupational Therapy Cancellation Note        Patient Name: Devin Gao  Today's Date: 1/17/2024 01/17/24 0942   Note Type   Note type Cancelled Session   Cancel Reasons Medical status   Additional Comments OT orders recieved, pt is currently pending imaging of foot and formal ortho plan. Will hold OT eval at this time and see s/p imaging as appropriate/able     Radha Baumann MS, OTR/L

## 2024-01-18 VITALS
SYSTOLIC BLOOD PRESSURE: 125 MMHG | TEMPERATURE: 98.9 F | OXYGEN SATURATION: 93 % | HEART RATE: 80 BPM | DIASTOLIC BLOOD PRESSURE: 78 MMHG | RESPIRATION RATE: 18 BRPM | WEIGHT: 181 LBS | BODY MASS INDEX: 25.34 KG/M2 | HEIGHT: 71 IN

## 2024-01-18 LAB
ANION GAP SERPL CALCULATED.3IONS-SCNC: 9 MMOL/L
ATRIAL RATE: 92 BPM
BUN SERPL-MCNC: 7 MG/DL (ref 5–25)
CA-I BLD-SCNC: 1.03 MMOL/L (ref 1.12–1.32)
CALCIUM SERPL-MCNC: 8.1 MG/DL (ref 8.4–10.2)
CHLORIDE SERPL-SCNC: 98 MMOL/L (ref 96–108)
CO2 SERPL-SCNC: 25 MMOL/L (ref 21–32)
CREAT SERPL-MCNC: 0.52 MG/DL (ref 0.6–1.3)
ERYTHROCYTE [DISTWIDTH] IN BLOOD BY AUTOMATED COUNT: 12.2 % (ref 11.6–15.1)
GFR SERPL CREATININE-BSD FRML MDRD: 134 ML/MIN/1.73SQ M
GLUCOSE SERPL-MCNC: 90 MG/DL (ref 65–140)
HCT VFR BLD AUTO: 30.8 % (ref 36.5–49.3)
HGB BLD-MCNC: 10.6 G/DL (ref 12–17)
MAGNESIUM SERPL-MCNC: 1.9 MG/DL (ref 1.9–2.7)
MCH RBC QN AUTO: 35.6 PG (ref 26.8–34.3)
MCHC RBC AUTO-ENTMCNC: 34.4 G/DL (ref 31.4–37.4)
MCV RBC AUTO: 103 FL (ref 82–98)
P AXIS: 53 DEGREES
PLATELET # BLD AUTO: 80 THOUSANDS/UL (ref 149–390)
PMV BLD AUTO: 11 FL (ref 8.9–12.7)
POTASSIUM SERPL-SCNC: 3.4 MMOL/L (ref 3.5–5.3)
PR INTERVAL: 130 MS
QRS AXIS: 46 DEGREES
QRSD INTERVAL: 90 MS
QT INTERVAL: 370 MS
QTC INTERVAL: 457 MS
RBC # BLD AUTO: 2.98 MILLION/UL (ref 3.88–5.62)
SODIUM SERPL-SCNC: 132 MMOL/L (ref 135–147)
T WAVE AXIS: 14 DEGREES
VENTRICULAR RATE: 92 BPM
WBC # BLD AUTO: 9.55 THOUSAND/UL (ref 4.31–10.16)

## 2024-01-18 PROCEDURE — 83735 ASSAY OF MAGNESIUM: CPT

## 2024-01-18 PROCEDURE — 99233 SBSQ HOSP IP/OBS HIGH 50: CPT | Performed by: PHYSICIAN ASSISTANT

## 2024-01-18 PROCEDURE — 82330 ASSAY OF CALCIUM: CPT

## 2024-01-18 PROCEDURE — 80048 BASIC METABOLIC PNL TOTAL CA: CPT

## 2024-01-18 PROCEDURE — 85027 COMPLETE CBC AUTOMATED: CPT

## 2024-01-18 RX ORDER — POTASSIUM CHLORIDE 20 MEQ/1
40 TABLET, EXTENDED RELEASE ORAL ONCE
Status: COMPLETED | OUTPATIENT
Start: 2024-01-18 | End: 2024-01-18

## 2024-01-18 RX ORDER — LIDOCAINE 4 G/G
1 PATCH TOPICAL DAILY
Start: 2024-01-18

## 2024-01-18 RX ORDER — CALCIUM GLUCONATE 20 MG/ML
2 INJECTION, SOLUTION INTRAVENOUS ONCE
Status: COMPLETED | OUTPATIENT
Start: 2024-01-18 | End: 2024-01-18

## 2024-01-18 RX ORDER — OXYCODONE HYDROCHLORIDE 5 MG/1
5-10 TABLET ORAL EVERY 4 HOURS PRN
Qty: 36 TABLET | Refills: 0 | Status: SHIPPED | OUTPATIENT
Start: 2024-01-18 | End: 2024-01-21

## 2024-01-18 RX ORDER — MAGNESIUM SULFATE HEPTAHYDRATE 40 MG/ML
2 INJECTION, SOLUTION INTRAVENOUS ONCE
Status: COMPLETED | OUTPATIENT
Start: 2024-01-18 | End: 2024-01-18

## 2024-01-18 RX ORDER — AMOXICILLIN 500 MG/1
500 CAPSULE ORAL EVERY 8 HOURS SCHEDULED
Qty: 18 CAPSULE | Refills: 0 | Status: SHIPPED | OUTPATIENT
Start: 2024-01-18 | End: 2024-01-24

## 2024-01-18 RX ORDER — GABAPENTIN 100 MG/1
100 CAPSULE ORAL 3 TIMES DAILY
Qty: 42 CAPSULE | Refills: 0 | Status: SHIPPED | OUTPATIENT
Start: 2024-01-18 | End: 2024-02-01

## 2024-01-18 RX ORDER — CHLORHEXIDINE GLUCONATE ORAL RINSE 1.2 MG/ML
15 SOLUTION DENTAL EVERY 12 HOURS SCHEDULED
Qty: 180 ML | Refills: 0 | Status: SHIPPED | OUTPATIENT
Start: 2024-01-18 | End: 2024-01-24

## 2024-01-18 RX ORDER — METHOCARBAMOL 500 MG/1
500 TABLET, FILM COATED ORAL EVERY 6 HOURS SCHEDULED
Qty: 56 TABLET | Refills: 0 | Status: SHIPPED | OUTPATIENT
Start: 2024-01-18 | End: 2024-02-01

## 2024-01-18 RX ORDER — ACETAMINOPHEN 325 MG/1
650 TABLET ORAL EVERY 4 HOURS PRN
Start: 2024-01-18

## 2024-01-18 RX ADMIN — Medication 100 MG: at 08:10

## 2024-01-18 RX ADMIN — OXYCODONE HYDROCHLORIDE 10 MG: 10 TABLET ORAL at 05:16

## 2024-01-18 RX ADMIN — POTASSIUM CHLORIDE 40 MEQ: 1500 TABLET, EXTENDED RELEASE ORAL at 07:42

## 2024-01-18 RX ADMIN — HYDROMORPHONE HYDROCHLORIDE 0.5 MG: 1 INJECTION, SOLUTION INTRAMUSCULAR; INTRAVENOUS; SUBCUTANEOUS at 07:42

## 2024-01-18 RX ADMIN — METHOCARBAMOL TABLETS 500 MG: 500 TABLET, COATED ORAL at 07:42

## 2024-01-18 RX ADMIN — ACETAMINOPHEN 975 MG: 325 TABLET, FILM COATED ORAL at 13:34

## 2024-01-18 RX ADMIN — MAGNESIUM SULFATE HEPTAHYDRATE 2 G: 40 INJECTION, SOLUTION INTRAVENOUS at 10:23

## 2024-01-18 RX ADMIN — FOLIC ACID 1 MG: 1 TABLET ORAL at 08:10

## 2024-01-18 RX ADMIN — GABAPENTIN 100 MG: 100 CAPSULE ORAL at 08:10

## 2024-01-18 RX ADMIN — METHOCARBAMOL TABLETS 500 MG: 500 TABLET, COATED ORAL at 13:34

## 2024-01-18 RX ADMIN — OXYCODONE HYDROCHLORIDE 10 MG: 10 TABLET ORAL at 10:26

## 2024-01-18 RX ADMIN — ENOXAPARIN SODIUM 30 MG: 30 INJECTION SUBCUTANEOUS at 08:11

## 2024-01-18 RX ADMIN — CHLORHEXIDINE GLUCONATE 15 ML: 1.2 SOLUTION ORAL at 08:10

## 2024-01-18 RX ADMIN — ACETAMINOPHEN 975 MG: 325 TABLET, FILM COATED ORAL at 05:06

## 2024-01-18 RX ADMIN — AMPICILLIN AND SULBACTAM 3 G: 100; 50 INJECTION, POWDER, FOR SOLUTION INTRAVENOUS at 01:15

## 2024-01-18 RX ADMIN — MULTIPLE VITAMINS W/ MINERALS TAB 1 TABLET: TAB ORAL at 08:10

## 2024-01-18 RX ADMIN — AMPICILLIN AND SULBACTAM 3 G: 100; 50 INJECTION, POWDER, FOR SOLUTION INTRAVENOUS at 07:46

## 2024-01-18 RX ADMIN — OXYCODONE HYDROCHLORIDE 10 MG: 10 TABLET ORAL at 01:15

## 2024-01-18 RX ADMIN — METHOCARBAMOL TABLETS 500 MG: 500 TABLET, COATED ORAL at 01:15

## 2024-01-18 RX ADMIN — LIDOCAINE 5% 1 PATCH: 700 PATCH TOPICAL at 08:10

## 2024-01-18 RX ADMIN — CALCIUM GLUCONATE 2 G: 20 INJECTION, SOLUTION INTRAVENOUS at 08:11

## 2024-01-18 NOTE — ASSESSMENT & PLAN NOTE
- Small to moderate anterior right upper lobe and trace anterior right middle lobe pulmonary contusion.   - CXR from 1/17/2024 was reviewed and stable.  - Aggressive pulmonary hygiene, able to pull 1500 on IS.

## 2024-01-18 NOTE — ASSESSMENT & PLAN NOTE
- Multiple right-sided rib fractures (3-5, age indeterminate 7-9), present on admission.  - Continue rib fracture protocol.  - Continue to encourage incentive spirometer use and adequate pulmonary hygiene.    - Appreciate APS evaluation and recommendations.  - Continue multimodal analgesic regimen.  - Repeat chest x-ray from 1/17/2024 was stable.  - PT and OT evaluation and treatment as indicated.  - Outpatient follow-up in the trauma clinic for re-evaluation in approximately 2 weeks.
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
## 2024-01-18 NOTE — ASSESSMENT & PLAN NOTE
- Right sided upper teeth are broken with associated buccal swelling, no identified lacerations in the oral mucosa  - OMS consulted; evaluation and recommendations appreciated.  - Started IV Unasyn 3g every 6 hours and patient discharged on Amoxicillin 500mg TID for total 7d course.  - Recommended outpatient follow-up with OMS.

## 2024-01-18 NOTE — ASSESSMENT & PLAN NOTE
- 2g ancef given in trauma bay for possible open wrist fracture  - XR Left wrist: Unusual bone densities dorsal to the distal radius. Cannot exclude acute fracture   - CT LUE: Small bony fragments immediately dorsal to the distal radius attributed to age-indeterminate displaced fracture   - Appreciate Orthopedics consult and recommendations, nonoperative  - Laceration washed out and repaired at bedside, volar splint placed  - NWB LUE in splint  - ancef open fracture protocol abx completed  - Neurovascularly intact  - Multimodal pain regimen  - PT and OT
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
## 2024-01-18 NOTE — ASSESSMENT & PLAN NOTE
- Reports he drinks 4 liquor drinks daily, last drink was 1/15  - History of alcohol withdrawal, unknown if he has seizures  - thiamine, folate, multivitamin  - Started to have tremors/ shakiness and mild hypertension and tachycardia - pt has gotten phenobarbital 260 mg IV  - Monitor on [facility] protocol

## 2024-01-18 NOTE — INCIDENTAL FINDINGS
The following findings require follow up:  Radiographic finding     Findings and Follow up required:       1) Liver is diffusely decreased in density consistent with fatty change. This finding was noted incidentally on your trauma imaging. No CT evidence of suspicious hepatic (liver) mass. Normal hepatic (liver) contours. A malignancy (cancer) cannot be completely excluded based on trauma imaging alone.  Recommend short-term outpatient follow-up with primary care provider to review the finding and for further surveillance as indicated.       2) One or more sharply circumscribed subcentimeter renal (kidney) hypodensities are noted incidentally on your trauma imaging. These lesions are too small to accurately characterize, but are statistically most likely to represent benign cortical renal (kidney cyst(s).  A malignancy (cancer) cannot be completely excluded based on trauma imaging alone.  Recommend short-term outpatient follow-up with primary care provider to review the finding and for further surveillance as indicated. According to the guidelines published in the White Paper of the ACR Incidental Findings Committee (Radiology 2010), no further workup of these lesions is recommended at this time.      Follow up should be done within 2 week(s)    The above noted incidental findings were discussed with the patient. The patient demonstrated understanding of the findings and the follow-up recommendations.    Please notify the following clinician to assist with the follow up:   Primary Care Provider
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
## 2024-01-18 NOTE — ASSESSMENT & PLAN NOTE
- Multiple right-sided rib fractures (3-5, age indeterminate 7-9), present on admission.  - Continue rib fracture protocol.  - Continue to encourage incentive spirometer use and adequate pulmonary hygiene.    - Appreciate APS evaluation and recommendations.  - Continue multimodal analgesic regimen.  - patient is tolerating incentive spirometery well, no epidural. Started on lovenox BID  - Supplemental oxygen via nasal cannula as needed to maintain saturations greater than or equal to 94%.  - Repeat chest x-ray 1/17 stable  - PT and OT evaluation and treatment as indicated.  - Outpatient follow-up in the trauma clinic for re-evaluation in approximately 2 weeks.

## 2024-01-18 NOTE — ASSESSMENT & PLAN NOTE
- 2g ancef given in trauma bay for possible open wrist fracture  - XR Left wrist: Unusual bone densities dorsal to the distal radius. Cannot exclude acute fracture   - CT LUE: Small bony fragments immediately dorsal to the distal radius attributed to age-indeterminate displaced fracture   - Appreciate Orthopedics consult and recommendations, nonoperative  - Laceration washed out and repaired at bedside, volar splint placed  - May be weightbearing as tolerated in the left upper extremity  in splint.  - Ancef open fracture protocol antibiotics completed.  - Neurovascularly intact  - Multimodal pain regimen  - PT and OT.

## 2024-01-18 NOTE — ASSESSMENT & PLAN NOTE
- Reports he drinks 4 liquor drinks daily, last drink was 1/15/2024.  - History of alcohol withdrawal, unknown if he has seizures  - Started to have tremors/ shakiness and mild hypertension and tachycardia - pt has gotten phenobarbital 260 mg IV.  - Monitor on [facility] protocol.

## 2024-01-18 NOTE — ASSESSMENT & PLAN NOTE
- Small to moderate anterior right upper lobe and trace anterior right middle lobe pulmonary contusion.   - CXR 1/17 AM stable  - Aggressive pulmonary hygiene, able to pull 1500 on IS.

## 2024-01-18 NOTE — ASSESSMENT & PLAN NOTE
- Small shoulder laceration repaired with 1 suture 1/16/2024 - remove in 5-7 days (1/21/2024-1/23/2024).

## 2024-01-18 NOTE — PLAN OF CARE
Problem: PAIN - ADULT  Goal: Verbalizes/displays adequate comfort level or baseline comfort level  Description: Interventions:  - Encourage patient to monitor pain and request assistance  - Assess pain using appropriate pain scale  - Administer analgesics based on type and severity of pain and evaluate response  - Implement non-pharmacological measures as appropriate and evaluate response  - Consider cultural and social influences on pain and pain management  - Notify physician/advanced practitioner if interventions unsuccessful or patient reports new pain  Outcome: Progressing     Problem: INFECTION - ADULT  Goal: Absence or prevention of progression during hospitalization  Description: INTERVENTIONS:  - Assess and monitor for signs and symptoms of infection  - Monitor lab/diagnostic results  - Monitor all insertion sites, i.e. indwelling lines, tubes, and drains  - Monitor endotracheal if appropriate and nasal secretions for changes in amount and color  - [place] appropriate cooling/warming therapies per order  - Administer medications as ordered  - Instruct and encourage patient and family to use good hand hygiene technique  - Identify and instruct in appropriate isolation precautions for identified infection/condition  Outcome: Progressing  Goal: Absence of fever/infection during neutropenic period  Description: INTERVENTIONS:  - Monitor WBC    Outcome: Progressing     Problem: SAFETY ADULT  Goal: Patient will remain free of falls  Description: INTERVENTIONS:  - Educate patient/family on patient safety including physical limitations  - Instruct patient to call for assistance with activity   - Consult OT/PT to assist with strengthening/mobility   - Keep Call bell within reach  - Keep bed low and locked with side rails adjusted as appropriate  - Keep care items and personal belongings within reach  - Initiate and maintain comfort rounds  - Apply yellow socks and bracelet for high fall risk patients  - Consider  moving patient to room near nurses station  Outcome: Progressing  Goal: Maintain or return to baseline ADL function  Description: INTERVENTIONS:  -  Assess patient's ability to carry out ADLs; assess patient's baseline for ADL function and identify physical deficits which impact ability to perform ADLs (bathing, care of mouth/teeth, toileting, grooming, dressing, etc.)  - Assess/evaluate cause of self-care deficits   - Assess range of motion  - Assess patient's mobility; develop plan if impaired  - Assess patient's need for assistive devices and provide as appropriate  - Encourage maximum independence but intervene and supervise when necessary  - Involve family in performance of ADLs  - Assess for home care needs following discharge   - Consider OT consult to assist with ADL evaluation and planning for discharge  - Provide patient education as appropriate  Outcome: Progressing  Goal: Maintains/Returns to pre admission functional level  Description: INTERVENTIONS:  - Perform AM-PAC 6 Click Basic Mobility/ Daily Activity assessment daily.  - Set and communicate daily mobility goal to care team and patient/family/caregiver.   - Collaborate with rehabilitation services on mobility goals if consulted  - Out of bed for toileting  - Record patient progress and toleration of activity level   Outcome: Progressing     Problem: DISCHARGE PLANNING  Goal: Discharge to home or other facility with appropriate resources  Description: INTERVENTIONS:  - Identify barriers to discharge w/patient and caregiver  - Arrange for needed discharge resources and transportation as appropriate  - Identify discharge learning needs (meds, wound care, etc.)  - Arrange for interpretive services to assist at discharge as needed  - Refer to Case Management Department for coordinating discharge planning if the patient needs post-hospital services based on physician/advanced practitioner order or complex needs related to functional status, cognitive  ability, or social support system  Outcome: Progressing     Problem: Knowledge Deficit  Goal: Patient/family/caregiver demonstrates understanding of disease process, treatment plan, medications, and discharge instructions  Description: Complete learning assessment and assess knowledge base.  Interventions:  - Provide teaching at level of understanding  - Provide teaching via preferred learning methods  Outcome: Progressing     Problem: Nutrition/Hydration-ADULT  Goal: Nutrient/Hydration intake appropriate for improving, restoring or maintaining nutritional needs  Description: Monitor and assess patient's nutrition/hydration status for malnutrition. Collaborate with interdisciplinary team and initiate plan and interventions as ordered.  Monitor patient's weight and dietary intake as ordered or per policy. Utilize nutrition screening tool and intervene as necessary. Determine patient's food preferences and provide high-protein, high-caloric foods as appropriate.     INTERVENTIONS:  - Monitor oral intake, urinary output, labs, and treatment plans  - Assess nutrition and hydration status and recommend course of action  - Evaluate amount of meals eaten  - Assist patient with eating if necessary   - Allow adequate time for meals  - Recommend/ encourage appropriate diets, oral nutritional supplements, and vitamin/mineral supplements  - Order, calculate, and assess calorie counts as needed  - Recommend, monitor, and adjust tube feedings and TPN/PPN based on assessed needs  - Assess need for intravenous fluids  - Provide specific nutrition/hydration education as appropriate  - Include patient/family/caregiver in decisions related to nutrition  Outcome: Progressing

## 2024-01-18 NOTE — ASSESSMENT & PLAN NOTE
- Right sided upper teeth are broken with associated buccal swelling, no identified lacerations in the oral mucosa  - OMS consulted, appreciate recs  - started unasyn 3g q6h, discharge on PO Amoxicillin 500mg TID for total 7d course (started on 1/17)

## 2024-01-18 NOTE — PLAN OF CARE
Problem: PAIN - ADULT  Goal: Verbalizes/displays adequate comfort level or baseline comfort level  Description: Interventions:  - Encourage patient to monitor pain and request assistance  - Assess pain using appropriate pain scale  - Administer analgesics based on type and severity of pain and evaluate response  - Implement non-pharmacological measures as appropriate and evaluate response  - Consider cultural and social influences on pain and pain management  - Notify physician/advanced practitioner if interventions unsuccessful or patient reports new pain  Outcome: Progressing     Problem: INFECTION - ADULT  Goal: Absence or prevention of progression during hospitalization  Description: INTERVENTIONS:  - Assess and monitor for signs and symptoms of infection  - Monitor lab/diagnostic results  - Monitor all insertion sites, i.e. indwelling lines, tubes, and drains  - Monitor endotracheal if appropriate and nasal secretions for changes in amount and color  - [place] appropriate cooling/warming therapies per order  - Administer medications as ordered  - Instruct and encourage patient and family to use good hand hygiene technique  - Identify and instruct in appropriate isolation precautions for identified infection/condition  Outcome: Progressing     Problem: SAFETY ADULT  Goal: Patient will remain free of falls  Description: INTERVENTIONS:  - Educate patient/family on patient safety including physical limitations  - Instruct patient to call for assistance with activity   - Consult OT/PT to assist with strengthening/mobility   - Keep Call bell within reach  - Keep bed low and locked with side rails adjusted as appropriate  - Keep care items and personal belongings within reach  - Initiate and maintain comfort rounds  - Make Fall Risk Sign visible to staff  - Apply yellow socks and bracelet for high fall risk patients  - Consider moving patient to room near nurses station  Outcome: Progressing

## 2024-01-29 ENCOUNTER — APPOINTMENT (OUTPATIENT)
Dept: RADIOLOGY | Facility: AMBULARY SURGERY CENTER | Age: 39
End: 2024-01-29
Attending: STUDENT IN AN ORGANIZED HEALTH CARE EDUCATION/TRAINING PROGRAM
Payer: COMMERCIAL

## 2024-01-29 ENCOUNTER — OFFICE VISIT (OUTPATIENT)
Dept: OBGYN CLINIC | Facility: CLINIC | Age: 39
End: 2024-01-29

## 2024-01-29 VITALS — WEIGHT: 181 LBS | HEIGHT: 71 IN | BODY MASS INDEX: 25.34 KG/M2

## 2024-01-29 DIAGNOSIS — S69.92XA LEFT WRIST INJURY, INITIAL ENCOUNTER: Primary | ICD-10-CM

## 2024-01-29 DIAGNOSIS — S69.92XA LEFT WRIST INJURY, INITIAL ENCOUNTER: ICD-10-CM

## 2024-01-29 PROCEDURE — 73100 X-RAY EXAM OF WRIST: CPT

## 2024-01-29 PROCEDURE — 99024 POSTOP FOLLOW-UP VISIT: CPT | Performed by: STUDENT IN AN ORGANIZED HEALTH CARE EDUCATION/TRAINING PROGRAM
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
## 2024-01-30 ENCOUNTER — OFFICE VISIT (OUTPATIENT)
Dept: SURGERY | Facility: CLINIC | Age: 39
End: 2024-01-30
Payer: COMMERCIAL

## 2024-01-30 VITALS
WEIGHT: 181.9 LBS | DIASTOLIC BLOOD PRESSURE: 88 MMHG | TEMPERATURE: 97.4 F | SYSTOLIC BLOOD PRESSURE: 120 MMHG | OXYGEN SATURATION: 97 % | HEART RATE: 91 BPM | RESPIRATION RATE: 14 BRPM | BODY MASS INDEX: 25.47 KG/M2 | HEIGHT: 71 IN

## 2024-01-30 DIAGNOSIS — S22.41XD CLOSED FRACTURE OF MULTIPLE RIBS OF RIGHT SIDE WITH ROUTINE HEALING, SUBSEQUENT ENCOUNTER: Primary | ICD-10-CM

## 2024-01-30 PROCEDURE — 99212 OFFICE O/P EST SF 10 MIN: CPT

## 2024-01-30 NOTE — ASSESSMENT & PLAN NOTE
- Multiple right-sided rib fractures  - Continue to encourage incentive spirometer use and adequate pulmonary hygiene.  Currently pulling 1500 mL on I.S.  - SpO2 100% on RA  - Patient reports increased light activity. 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 Due to coexisting orthopedic injuries, will defer work clearance to ortho.   - Continue Tylenol as needed for pain.   - Patient starting outpatient PT on Monday, 2/5  - No need for further follow up with Trauma clinic. Contact as needed.

## 2024-01-30 NOTE — PROGRESS NOTES
Office Visit - Trauma Office Note  Devin Gao MRN: [id number]  Encounter: [id number]    Assessment and Plan  Problem List Items Addressed This Visit          Musculoskeletal and Integument    Closed fracture of multiple ribs of right side - Primary     - Multiple right-sided rib fractures  - Continue to encourage incentive spirometer use and adequate pulmonary hygiene.  Currently pulling 1500 mL on I.S.  - SpO2 100% on RA  - Patient reports increased light activity. 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 Due to coexisting orthopedic injuries, will defer work clearance to ortho.   - Continue Tylenol as needed for pain.   - Patient starting outpatient PT on Monday, 2/5  - No need for further follow up with Trauma clinic. Contact as needed.               Does the patient have a positive PTSD Screen? No  Was a psychiatric referral provided? no    Chief Complaint:  Devin Gao is a 38 y.o. male who presents for No chief complaint on file.    Subjective  Patient reports he is doing well from a refracture standpoint.  He does report ongoing rib pain especially with coughing or sneezing.  He is only requiring Tylenol as needed for pain.  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  He does endorse coexisting orthopedic injuries that are limiting his activity.  Patient reports that he is starting outpatient PT on Monday, 2/5.  Overall, patient is doing well from a trauma clinic standpoint and there is no need for him to follow-up with the trauma clinic.    Past Medical History:   Diagnosis Date   • Closed T12 fracture (HCC)        History reviewed. No pertinent surgical history.    History reviewed. No pertinent family history.    Social History     Tobacco Use   • Smoking status: Never   • Smokeless tobacco: Never   Substance Use Topics   • Alcohol use: Yes     Comment: daily   • Drug use: Yes      Types: Marijuana     Comment: reports he has a medical marijuana card        Medications  Current Outpatient Medications on File Prior to Visit   Medication Sig Dispense Refill   • acetaminophen (TYLENOL) 325 mg tablet Take 2 tablets (650 mg total) by mouth every 4 (four) hours as needed for mild pain     • gabapentin (NEURONTIN) 100 mg capsule Take 1 capsule (100 mg total) by mouth 3 (three) times a day for 14 days 42 capsule 0   • Lidocaine 4 % PTCH Apply 1 patch topically daily     • methocarbamol (ROBAXIN) 500 mg tablet Take 1 tablet (500 mg total) by mouth every 6 (six) hours for 14 days 56 tablet 0     No current facility-administered medications on file prior to visit.       Allergies  No Known Allergies    Review of Systems   Constitutional: Negative.    HENT: Negative.     Eyes: Negative.    Respiratory: Negative.     Cardiovascular: Negative.    Gastrointestinal: Negative.    Genitourinary: Negative.    Musculoskeletal:         Right rib pain, left wrist pain, right ankle pain   Skin: Negative.    Neurological: Negative.    Psychiatric/Behavioral: Negative.         Objective  Vitals:    01/30/24 1337   BP: 120/88   Pulse: 91   Resp: 14   Temp: (!) 97.4 °F (36.3 °C)   SpO2: 97%       Physical Exam  Constitutional:       General: He is not in acute distress.     Appearance: Normal appearance. He is not ill-appearing.   HENT:      Head: Normocephalic and atraumatic.      Right Ear: External ear normal.      Left Ear: External ear normal.      Nose: Nose normal.      Mouth/Throat:      Mouth: Mucous membranes are moist.   Eyes:      Extraocular Movements: Extraocular movements intact.      Pupils: Pupils are equal, round, and reactive to light.   Cardiovascular:      Rate and Rhythm: Normal rate and regular rhythm.      Pulses: Normal pulses.      Heart sounds: Normal heart sounds.   Pulmonary:      Effort: Pulmonary effort is normal. No respiratory distress.      Breath sounds: Normal breath sounds. No  wheezing.   Chest:      Chest wall: Tenderness (right rib TTP) present.   Abdominal:      General: Abdomen is flat. There is no distension.      Palpations: Abdomen is soft.      Tenderness: There is no abdominal tenderness. There is no guarding.   Musculoskeletal:         General: No swelling, tenderness or signs of injury. Normal range of motion.      Cervical back: Normal range of motion and neck supple.   Lymphadenopathy:      Cervical: No cervical adenopathy.   Skin:     General: Skin is warm and dry.      Capillary Refill: Capillary refill takes less than 2 seconds.   Neurological:      General: No focal deficit present.      Mental Status: He is alert and oriented to person, place, and time. Mental status is at baseline.      Motor: No weakness.   Psychiatric:         Mood and Affect: Mood normal.         Behavior: Behavior normal.

## 2024-02-06 LAB
ATRIAL RATE: 92 BPM
P AXIS: 53 DEGREES
PR INTERVAL: 130 MS
QRS AXIS: 46 DEGREES
QRSD INTERVAL: 90 MS
QT INTERVAL: 370 MS
QTC INTERVAL: 457 MS
T WAVE AXIS: 14 DEGREES
VENTRICULAR RATE: 92 BPM

## 2024-02-07 ENCOUNTER — EVALUATION (OUTPATIENT)
Dept: PHYSICAL THERAPY | Facility: CLINIC | Age: 39
End: 2024-02-07
Payer: COMMERCIAL

## 2024-02-07 DIAGNOSIS — S69.92XA LEFT WRIST INJURY, INITIAL ENCOUNTER: ICD-10-CM

## 2024-02-07 DIAGNOSIS — S99.911A INJURY OF RIGHT ANKLE, INITIAL ENCOUNTER: Primary | ICD-10-CM

## 2024-02-07 PROCEDURE — 97161 PT EVAL LOW COMPLEX 20 MIN: CPT | Performed by: PHYSICAL MEDICINE & REHABILITATION
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
## 2024-02-08 ENCOUNTER — TELEPHONE (OUTPATIENT)
Dept: OBGYN CLINIC | Facility: HOSPITAL | Age: 39
End: 2024-02-08

## 2024-02-08 NOTE — TELEPHONE ENCOUNTER
Caller: Marleny Gardner     Doctor: Ina Winters     Reason for call: PT orders.     Patient will need new orders due to Auto Claim requirements .     The order will need to have frequency and duration to authorize  additional visits.     Please advise when completed   Call back#: [phone]

## 2024-02-09 DIAGNOSIS — S69.92XA LEFT WRIST INJURY, INITIAL ENCOUNTER: Primary | ICD-10-CM

## 2024-02-09 NOTE — TELEPHONE ENCOUNTER
Called and LVM for facility, their office was closed so no one was able to take my call.  Gave call back number if they have any questions.

## 2024-02-12 ENCOUNTER — APPOINTMENT (OUTPATIENT)
Dept: PHYSICAL THERAPY | Facility: CLINIC | Age: 39
End: 2024-02-12
Payer: COMMERCIAL

## 2024-02-14 ENCOUNTER — APPOINTMENT (OUTPATIENT)
Dept: PHYSICAL THERAPY | Facility: CLINIC | Age: 39
End: 2024-02-14
Payer: COMMERCIAL

## 2024-02-14 DIAGNOSIS — S69.92XA LEFT WRIST INJURY, INITIAL ENCOUNTER: Primary | ICD-10-CM

## 2024-02-19 ENCOUNTER — APPOINTMENT (OUTPATIENT)
Dept: PHYSICAL THERAPY | Facility: CLINIC | Age: 39
End: 2024-02-19
Payer: COMMERCIAL

## 2024-02-21 ENCOUNTER — APPOINTMENT (OUTPATIENT)
Dept: PHYSICAL THERAPY | Facility: CLINIC | Age: 39
End: 2024-02-21
Payer: COMMERCIAL
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
## 2024-02-26 ENCOUNTER — OFFICE VISIT (OUTPATIENT)
Dept: PHYSICAL THERAPY | Facility: CLINIC | Age: 39
End: 2024-02-26
Payer: COMMERCIAL

## 2024-02-26 DIAGNOSIS — S69.92XA LEFT WRIST INJURY, INITIAL ENCOUNTER: Primary | ICD-10-CM

## 2024-02-26 PROCEDURE — 97112 NEUROMUSCULAR REEDUCATION: CPT | Performed by: PHYSICAL MEDICINE & REHABILITATION

## 2024-02-26 PROCEDURE — 97140 MANUAL THERAPY 1/> REGIONS: CPT | Performed by: PHYSICAL MEDICINE & REHABILITATION

## 2024-02-26 PROCEDURE — 97110 THERAPEUTIC EXERCISES: CPT | Performed by: PHYSICAL MEDICINE & REHABILITATION

## 2024-02-26 NOTE — PROGRESS NOTES
"Daily Note     Today's date: 2024  Patient name: Devin Gao  : 1985  MRN: [id number]  Referring provider: Arley Buckley DO  Dx:   Encounter Diagnosis     ICD-10-CM    1. Left wrist injury, initial encounter  S69.92XA           Start Time: 1530  Stop Time: 1615  Total time in clinic (min): 45 minutes    Subjective: Patient reports that his right ankle/foot has been sore. Patient states compliance with HEP stating that his wrist has been stiff.      Objective: See treatment diary below      Assessment: Tolerated treatment well. Impaired mobility of left wrist in all planes. Patient exhibited good technique with therapeutic exercises and would benefit from continued PT      Plan: Continue per plan of care.      Precautions:   Past Medical History:   Diagnosis Date   • Closed T12 fracture (HCC)        Date: 2024      Visit # 1 EVAL 2      Manuals        Light right ankle stretching  TC      Light left wrist range stretching  TC                      Neuro Re-Ed        Foot intrinsics  Seated 30x                      Toe yoga  30x      Towel curls  2'                                      Ther Ex        Wrist flexion/ext stretch  30\" x 5 each       Active wrist flex/ext  20x RTB cues for control      Active wrist radial/ulnar deviation  #4 20x      Resisted pronation/supination  #4 20x      ProStretch        Gripper  #35 20x with 5\" holds                              Ther Activity                        Gait Training                        Modalities                          Access Code: [email or web address]  URL: [email or web address]  Date: 2024  Prepared by: Johann Cardoza    Exercises  - Supine Diaphragmatic Breathing  - 2 x daily - 7 x weekly - 2 sets - 10 reps  - Seated Wrist Flexion Stretch  - 2 x daily - 7 x weekly - 4 sets - 20 sec hold  - Seated Wrist Extension Stretch  - 2 x daily - 7 x weekly - 4 sets - 20 sec hold  - Wrist Radial Ulnar Deviation AROM  - 2 x " daily - 7 x weekly - 2 sets - 10 reps  - Seated Gripping Towel  - 2 x daily - 7 x weekly - 2 sets - 10 reps  - Supine Ankle Circles  - 2 x daily - 7 x weekly - 2 sets - 10 reps  - Towel Scrunches  - 2 x daily - 7 x weekly - 2 sets - 10 reps

## 2024-02-28 ENCOUNTER — OFFICE VISIT (OUTPATIENT)
Dept: PHYSICAL THERAPY | Facility: CLINIC | Age: 39
End: 2024-02-28
Payer: COMMERCIAL

## 2024-02-28 DIAGNOSIS — S99.911A INJURY OF RIGHT ANKLE, INITIAL ENCOUNTER: ICD-10-CM

## 2024-02-28 DIAGNOSIS — S69.92XA LEFT WRIST INJURY, INITIAL ENCOUNTER: Primary | ICD-10-CM

## 2024-02-28 PROCEDURE — 97110 THERAPEUTIC EXERCISES: CPT | Performed by: PHYSICAL MEDICINE & REHABILITATION

## 2024-02-28 PROCEDURE — 97140 MANUAL THERAPY 1/> REGIONS: CPT | Performed by: PHYSICAL MEDICINE & REHABILITATION

## 2024-02-28 PROCEDURE — 97112 NEUROMUSCULAR REEDUCATION: CPT | Performed by: PHYSICAL MEDICINE & REHABILITATION

## 2024-02-28 NOTE — PROGRESS NOTES
"Daily Note     Today's date: 2024  Patient name: Devin Gao  : 1985  MRN: [id number]  Referring provider: Arley Buckley DO  Dx:   Encounter Diagnosis     ICD-10-CM    1. Left wrist injury, initial encounter  S69.92XA       2. Injury of right ankle, initial encounter  S99.911A           Start Time: 1530  Stop Time: 1615  Total time in clinic (min): 45 minutes    Subjective: Patient reports some anterior ankle soreness following last session described as stiffness.       Objective: See treatment diary below      Assessment: Tolerated treatment well. Focused today on manual mobility of left wrist including radiocarpal joint. Patient exhibited good technique with therapeutic exercises and would benefit from continued PT      Plan: Continue per plan of care.      Precautions:   Past Medical History:   Diagnosis Date   • Closed T12 fracture (HCC)        Date: 2024     Visit # 1 EVAL 2 3     Manuals        Light right ankle stretching  TC TC     Light left wrist range stretching  TC TC                     Neuro Re-Ed        Foot intrinsics  Seated 30x Seated 30x     Rows    #15 20x LUE     Shoulder extension   #10 20x LUE     Toe yoga  30x 30x     Towel curls  2'                                      Ther Ex        Wrist flexion/ext stretch  30\" x 5 each  30\" x 5 each      Active wrist flex/ext  20x RTB cues for control 20x RTB     Active wrist radial/ulnar deviation  #4 20x RTB 20x     Resisted pronation/supination  #4 20x #4 20x     ProStretch   30\" x 5 RLE     Reverse curls   #5 at celia 20x LUE     Gripper  #35 20x with 5\" holds      Heel raises   10x                     Ther Activity                        Gait Training                        Modalities                          Access Code: [email or web address]  URL: [email or web address]  Date: 2024  Prepared by: Johann Cardoza    Exercises  - Supine Diaphragmatic Breathing  - 2 x daily - 7 x weekly - 2 " sets - 10 reps  - Seated Wrist Flexion Stretch  - 2 x daily - 7 x weekly - 4 sets - 20 sec hold  - Seated Wrist Extension Stretch  - 2 x daily - 7 x weekly - 4 sets - 20 sec hold  - Wrist Radial Ulnar Deviation AROM  - 2 x daily - 7 x weekly - 2 sets - 10 reps  - Seated Gripping Towel  - 2 x daily - 7 x weekly - 2 sets - 10 reps  - Supine Ankle Circles  - 2 x daily - 7 x weekly - 2 sets - 10 reps  - Towel Scrunches  - 2 x daily - 7 x weekly - 2 sets - 10 reps

## 2024-03-04 ENCOUNTER — OFFICE VISIT (OUTPATIENT)
Dept: PHYSICAL THERAPY | Facility: CLINIC | Age: 39
End: 2024-03-04
Payer: COMMERCIAL

## 2024-03-04 DIAGNOSIS — S69.92XA LEFT WRIST INJURY, INITIAL ENCOUNTER: Primary | ICD-10-CM

## 2024-03-04 DIAGNOSIS — S99.911A INJURY OF RIGHT ANKLE, INITIAL ENCOUNTER: ICD-10-CM

## 2024-03-04 PROCEDURE — 97110 THERAPEUTIC EXERCISES: CPT

## 2024-03-04 PROCEDURE — 97112 NEUROMUSCULAR REEDUCATION: CPT

## 2024-03-04 PROCEDURE — 97140 MANUAL THERAPY 1/> REGIONS: CPT

## 2024-03-04 NOTE — PROGRESS NOTES
"Daily Note     Today's date: 3/4/2024  Patient name: Devin Gao  : 1985  MRN: [id number]  Referring provider: Arley Buckley DO  Dx:   Encounter Diagnosis     ICD-10-CM    1. Left wrist injury, initial encounter  S69.92XA       2. Injury of right ankle, initial encounter  S99.911A           Start Time: 1525          Subjective: My L wrist is a little sore, but my ankle swelling is improving.       Objective: See treatment diary below      Assessment: Tolerated treatment well. Patient demonstrated fatigue post treatment, exhibited good technique with therapeutic exercises, and would benefit from continued PT      Plan: Continue per plan of care.      Precautions:   Past Medical History:   Diagnosis Date    Closed T12 fracture (HCC)        Date: 2024 2024 2024 3/4/2024    Visit # 1 EVAL 2 3 4    Manuals        Light right ankle stretching  TC TC AD    Light left wrist range stretching  TC TC AD                    Neuro Re-Ed        Foot intrinsics  Seated 30x Seated 30x 30x seated     Rows    #15 20x LUE #15 20x LUE    Shoulder extension   #10 20x LUE #10 20x LUE    Toe yoga  30x 30x 30x    Towel curls  2'  ---                                    Ther Ex        Wrist flexion/ext stretch  30\" x 5 each  30\" x 5 each  30\" x 5 each     Active wrist flex/ext  20x RTB cues for control 20x RTB RTB x 20 reps     Active wrist radial/ulnar deviation  #4 20x RTB 20x RTB x 20 reps     Resisted pronation/supination  #4 20x #4 20x 20x 4#     ProStretch   30\" x 5 RLE 30\" x 5 RLE    Reverse curls   #5 at celia 20x LUE #5 at celia 20x LUE    Gripper  #35 20x with 5\" holds      Heel raises   10x 10x                Seated rockerboard 20x R LE (PF/DF)    Ther Activity                        Gait Training                        Modalities                          Access Code: [email or web address]  URL: [email or web address]  Date: 2024  Prepared by: Johann Cardoza    Exercises  - Supine " Diaphragmatic Breathing  - 2 x daily - 7 x weekly - 2 sets - 10 reps  - Seated Wrist Flexion Stretch  - 2 x daily - 7 x weekly - 4 sets - 20 sec hold  - Seated Wrist Extension Stretch  - 2 x daily - 7 x weekly - 4 sets - 20 sec hold  - Wrist Radial Ulnar Deviation AROM  - 2 x daily - 7 x weekly - 2 sets - 10 reps  - Seated Gripping Towel  - 2 x daily - 7 x weekly - 2 sets - 10 reps  - Supine Ankle Circles  - 2 x daily - 7 x weekly - 2 sets - 10 reps  - Towel Scrunches  - 2 x daily - 7 x weekly - 2 sets - 10 reps

## 2024-03-06 ENCOUNTER — OFFICE VISIT (OUTPATIENT)
Dept: PHYSICAL THERAPY | Facility: CLINIC | Age: 39
End: 2024-03-06
Payer: COMMERCIAL

## 2024-03-06 DIAGNOSIS — S69.92XA LEFT WRIST INJURY, INITIAL ENCOUNTER: Primary | ICD-10-CM

## 2024-03-06 DIAGNOSIS — S99.911A INJURY OF RIGHT ANKLE, INITIAL ENCOUNTER: ICD-10-CM

## 2024-03-06 PROCEDURE — 97140 MANUAL THERAPY 1/> REGIONS: CPT | Performed by: PHYSICAL MEDICINE & REHABILITATION

## 2024-03-06 PROCEDURE — 97112 NEUROMUSCULAR REEDUCATION: CPT | Performed by: PHYSICAL MEDICINE & REHABILITATION

## 2024-03-06 PROCEDURE — 97110 THERAPEUTIC EXERCISES: CPT | Performed by: PHYSICAL MEDICINE & REHABILITATION

## 2024-03-06 NOTE — PROGRESS NOTES
"Daily Note     Today's date: 3/6/2024  Patient name: Devin Gao  : 1985  MRN: [id number]  Referring provider: Arley Buckley DO  Dx:   Encounter Diagnosis     ICD-10-CM    1. Left wrist injury, initial encounter  S69.92XA       2. Injury of right ankle, initial encounter  S99.911A           Start Time: 1615  Stop Time: 1700  Total time in clinic (min): 45 minutes    Subjective: Patient reports that his left wrist is feeling quite a bit better, noting more mobility and less pain. Patient states however his right ankle is painful especially with prolonged walking/weight bearing described as stiff.      Objective: See treatment diary below      Assessment: Tolerated treatment well. Limited mobility into ankle dorsiflexion. Patient demonstrated fatigue post treatment, exhibited good technique with therapeutic exercises, and would benefit from continued PT      Plan: Continue per plan of care.      Precautions:   Past Medical History:   Diagnosis Date   • Closed T12 fracture (HCC)        Date: 2024 2024 2024 3/4/2024 2024   Visit # 1 EVAL 2 3 4 5   Manuals        Light right ankle stretching  TC TC AD TC into dorsiflexion with mobilization   Light left wrist range stretching  TC TC AD    Ankle MRE     4-way TC           Neuro Re-Ed        Foot intrinsics  Seated 30x Seated 30x 30x seated  30x seated    Rows    #15 20x LUE #15 20x LUE #15 20x LUE   Shoulder extension   #10 20x LUE #10 20x LUE #10 20x LUE   Toe yoga  30x 30x 30x 30x   Towel curls  2'  ---                                    Ther Ex        Wrist flexion/ext stretch  30\" x 5 each  30\" x 5 each  30\" x 5 each  30\" x 5 each    Active wrist flex/ext  20x RTB cues for control 20x RTB RTB x 20 reps  GTB x 20 reps   Active wrist radial/ulnar deviation  #4 20x RTB 20x RTB x 20 reps  GTB x 20 reps    Resisted pronation/supination  #4 20x #4 20x 20x 4#  20x 4#    ProStretch   30\" x 5 RLE 30\" x 5 RLE 30\" x 5 RLE   Reverse curls   " "#5 at celia 20x LUE #5 at celia 20x LUE #5 at celia 20x LUE   Gripper  #35 20x with 5\" holds      Heel raises   10x 10x 10x               Seated rockerboard 20x R LE (PF/DF)    Ther Activity                        Gait Training                        Modalities                          Access Code: [email or web address]  URL: [email or web address]  Date: 02/07/2024  Prepared by: Johann Cardoza    Exercises  - Supine Diaphragmatic Breathing  - 2 x daily - 7 x weekly - 2 sets - 10 reps  - Seated Wrist Flexion Stretch  - 2 x daily - 7 x weekly - 4 sets - 20 sec hold  - Seated Wrist Extension Stretch  - 2 x daily - 7 x weekly - 4 sets - 20 sec hold  - Wrist Radial Ulnar Deviation AROM  - 2 x daily - 7 x weekly - 2 sets - 10 reps  - Seated Gripping Towel  - 2 x daily - 7 x weekly - 2 sets - 10 reps  - Supine Ankle Circles  - 2 x daily - 7 x weekly - 2 sets - 10 reps  - Towel Scrunches  - 2 x daily - 7 x weekly - 2 sets - 10 reps           "

## 2024-03-11 ENCOUNTER — APPOINTMENT (OUTPATIENT)
Dept: RADIOLOGY | Facility: AMBULARY SURGERY CENTER | Age: 39
End: 2024-03-11
Attending: STUDENT IN AN ORGANIZED HEALTH CARE EDUCATION/TRAINING PROGRAM
Payer: COMMERCIAL

## 2024-03-11 ENCOUNTER — OFFICE VISIT (OUTPATIENT)
Dept: OBGYN CLINIC | Facility: CLINIC | Age: 39
End: 2024-03-11

## 2024-03-11 ENCOUNTER — OFFICE VISIT (OUTPATIENT)
Dept: PHYSICAL THERAPY | Facility: CLINIC | Age: 39
End: 2024-03-11
Payer: COMMERCIAL

## 2024-03-11 VITALS
SYSTOLIC BLOOD PRESSURE: 131 MMHG | DIASTOLIC BLOOD PRESSURE: 86 MMHG | BODY MASS INDEX: 25.47 KG/M2 | HEIGHT: 71 IN | HEART RATE: 89 BPM | WEIGHT: 181.9 LBS

## 2024-03-11 DIAGNOSIS — S99.911A INJURY OF RIGHT ANKLE, INITIAL ENCOUNTER: ICD-10-CM

## 2024-03-11 DIAGNOSIS — M79.671 RIGHT FOOT PAIN: ICD-10-CM

## 2024-03-11 DIAGNOSIS — S69.92XA LEFT WRIST INJURY, INITIAL ENCOUNTER: Primary | ICD-10-CM

## 2024-03-11 DIAGNOSIS — S69.92XA LEFT WRIST INJURY, INITIAL ENCOUNTER: ICD-10-CM

## 2024-03-11 PROCEDURE — 97140 MANUAL THERAPY 1/> REGIONS: CPT | Performed by: PHYSICAL MEDICINE & REHABILITATION

## 2024-03-11 PROCEDURE — 73630 X-RAY EXAM OF FOOT: CPT

## 2024-03-11 PROCEDURE — 97112 NEUROMUSCULAR REEDUCATION: CPT | Performed by: PHYSICAL MEDICINE & REHABILITATION

## 2024-03-11 PROCEDURE — 99024 POSTOP FOLLOW-UP VISIT: CPT | Performed by: STUDENT IN AN ORGANIZED HEALTH CARE EDUCATION/TRAINING PROGRAM

## 2024-03-11 PROCEDURE — 73110 X-RAY EXAM OF WRIST: CPT

## 2024-03-11 PROCEDURE — 97110 THERAPEUTIC EXERCISES: CPT | Performed by: PHYSICAL MEDICINE & REHABILITATION
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
## 2024-03-12 NOTE — PROGRESS NOTES
"Daily Note     Today's date: 3/12/2024  Patient name: Devin Gao  : 1985  MRN: [id number]  Referring provider: Arley Buckley DO  Dx:   Encounter Diagnosis     ICD-10-CM    1. Left wrist injury, initial encounter  S69.92XA       2. Injury of right ankle, initial encounter  S99.911A           Start Time: 1530  Stop Time: 1615  Total time in clinic (min): 45 minutes    Subjective: Patient reports he had a follow up with the ortho where x-rays were taken of the wrist and ankle showing good healing. Patient states that he is feeling better overall just having slow progress. Patient notes he will have a follow up with a neurologist this Wednesday.      Objective: See treatment diary below      Assessment: Tolerated treatment well. Good improvement in ankle dorsiflexion noted. Patient demonstrated fatigue post treatment, exhibited good technique with therapeutic exercises, and would benefit from continued PT      Plan: Continue per plan of care.      Precautions:   Past Medical History:   Diagnosis Date   • Closed T12 fracture (HCC)        Date: 2024 2024 2024 3/4/2024 2024   Visit # 1 EVAL 2 3 4 6   Manuals        Light right ankle stretching  TC TC AD TC into dorsiflexion with mobilization   Light left wrist range stretching  TC TC AD    Ankle MRE     4-way TC           Neuro Re-Ed        Foot intrinsics  Seated 30x Seated 30x 30x seated  30x seated    Rows    #15 20x LUE #15 20x LUE #15 20x LUE   Shoulder extension   #10 20x LUE #10 20x LUE #10 20x LUE   Toe yoga  30x 30x 30x 30x   Towel curls  2'  ---                                    Ther Ex        Wrist flexion/ext stretch  30\" x 5 each  30\" x 5 each  30\" x 5 each  30\" x 5 each    Active wrist flex/ext  20x RTB cues for control 20x RTB RTB x 20 reps  GTB x 20 reps   Active wrist radial/ulnar deviation  #4 20x RTB 20x RTB x 20 reps  GTB x 20 reps    Resisted pronation/supination  #4 20x #4 20x 20x 4#  20x 4#    ProStretch   " "30\" x 5 RLE 30\" x 5 RLE 30\" x 5 RLE   Reverse curls   #5 at celia 20x LUE #5 at celia 20x LUE #5 at celia 20x LUE   Gripper  #35 20x with 5\" holds      Heel raises   10x 10x 10x               Seated rockerboard 20x R LE (PF/DF)    Ther Activity                        Gait Training                        Modalities                          Access Code: [email or web address]  URL: [email or web address]  Date: 02/07/2024  Prepared by: Johann Cardoza    Exercises  - Supine Diaphragmatic Breathing  - 2 x daily - 7 x weekly - 2 sets - 10 reps  - Seated Wrist Flexion Stretch  - 2 x daily - 7 x weekly - 4 sets - 20 sec hold  - Seated Wrist Extension Stretch  - 2 x daily - 7 x weekly - 4 sets - 20 sec hold  - Wrist Radial Ulnar Deviation AROM  - 2 x daily - 7 x weekly - 2 sets - 10 reps  - Seated Gripping Towel  - 2 x daily - 7 x weekly - 2 sets - 10 reps  - Supine Ankle Circles  - 2 x daily - 7 x weekly - 2 sets - 10 reps  - Towel Scrunches  - 2 x daily - 7 x weekly - 2 sets - 10 reps           "

## 2024-03-13 ENCOUNTER — APPOINTMENT (OUTPATIENT)
Dept: PHYSICAL THERAPY | Facility: CLINIC | Age: 39
End: 2024-03-13
Payer: COMMERCIAL

## 2024-03-14 ENCOUNTER — OFFICE VISIT (OUTPATIENT)
Facility: CLINIC | Age: 39
End: 2024-03-14
Payer: COMMERCIAL

## 2024-03-14 DIAGNOSIS — S69.92XA LEFT WRIST INJURY, INITIAL ENCOUNTER: Primary | ICD-10-CM

## 2024-03-14 DIAGNOSIS — S99.911A INJURY OF RIGHT ANKLE, INITIAL ENCOUNTER: ICD-10-CM

## 2024-03-14 PROCEDURE — 97110 THERAPEUTIC EXERCISES: CPT

## 2024-03-14 PROCEDURE — 97112 NEUROMUSCULAR REEDUCATION: CPT

## 2024-03-14 PROCEDURE — 97140 MANUAL THERAPY 1/> REGIONS: CPT

## 2024-03-14 NOTE — PROGRESS NOTES
"Daily Note     Today's date: 3/14/2024  Patient name: Devin Gao  : 1985  MRN: [id number]  Referring provider: Arley Buckley DO  Dx:   Encounter Diagnosis     ICD-10-CM    1. Left wrist injury, initial encounter  S69.92XA       2. Injury of right ankle, initial encounter  S99.911A           Start Time: 1530  Stop Time: 1615  Total time in clinic (min): 45 minutes    Subjective: Patient arrives reporting he saw the neurologist yesterday, with additional testing to be scheduled.       Objective: See treatment diary below      Assessment: Tolerated treatment well and without pain noted during session. Continued gradual improvements in gait and mobility . Patient demonstrated fatigue post treatment, exhibited good technique with therapeutic exercises, and would benefit from continued PT.      Plan: Continue per plan of care.  Progress treatment as tolerated.       Precautions:   Past Medical History:   Diagnosis Date    Closed T12 fracture (HCC)        Date: 3/14/24    2024   Visit # 7    6   Manuals 6'       Light right ankle stretching LS-into DF w/ mobs    TC into dorsiflexion with mobilization   Light left wrist range stretching LS + scar mobs       Ankle MRE 4 way LS    4-way TC           Neuro Re-Ed 15'       Foot intrinsics 30x seated    30x seated    Rows  16# 2x10 L UE    #15 20x LUE   Shoulder extension OH 10# 2x10    #10 20x LUE   Toe yoga 30x    30x   Towel curls 30x                                       Ther Ex 20'       Wrist flexion/ext stretch 30\"x5 each    30\" x 5 each    Active wrist flex/ext GTB 20x    GTB x 20 reps   Active wrist radial/ulnar deviation GTB 20x    GTB x 20 reps    Resisted pronation/supination 4# 20x each, cues for dec speed    20x 4#    ProStretch 5x30\"    30\" x 5 RLE   Reverse curls     #5 at celia 20x LUE   Gripper        Heel raises 20x on 1/2 foam roll    10x   Ankle circles 20x CW/CCW               Ther Activity                        Gait Training      "                   Modalities                          Access Code: [email or web address]  URL: [email or web address]  Date: 02/07/2024  Prepared by: Johann Cardoza    Exercises  - Supine Diaphragmatic Breathing  - 2 x daily - 7 x weekly - 2 sets - 10 reps  - Seated Wrist Flexion Stretch  - 2 x daily - 7 x weekly - 4 sets - 20 sec hold  - Seated Wrist Extension Stretch  - 2 x daily - 7 x weekly - 4 sets - 20 sec hold  - Wrist Radial Ulnar Deviation AROM  - 2 x daily - 7 x weekly - 2 sets - 10 reps  - Seated Gripping Towel  - 2 x daily - 7 x weekly - 2 sets - 10 reps  - Supine Ankle Circles  - 2 x daily - 7 x weekly - 2 sets - 10 reps  - Towel Scrunches  - 2 x daily - 7 x weekly - 2 sets - 10 reps

## 2024-03-18 ENCOUNTER — EVALUATION (OUTPATIENT)
Dept: PHYSICAL THERAPY | Facility: CLINIC | Age: 39
End: 2024-03-18
Payer: COMMERCIAL

## 2024-03-18 DIAGNOSIS — S99.911A INJURY OF RIGHT ANKLE, INITIAL ENCOUNTER: ICD-10-CM

## 2024-03-18 DIAGNOSIS — S69.92XA LEFT WRIST INJURY, INITIAL ENCOUNTER: Primary | ICD-10-CM

## 2024-03-18 PROCEDURE — 97140 MANUAL THERAPY 1/> REGIONS: CPT | Performed by: PHYSICAL MEDICINE & REHABILITATION

## 2024-03-18 PROCEDURE — 97110 THERAPEUTIC EXERCISES: CPT | Performed by: PHYSICAL MEDICINE & REHABILITATION

## 2024-03-18 PROCEDURE — 97112 NEUROMUSCULAR REEDUCATION: CPT | Performed by: PHYSICAL MEDICINE & REHABILITATION
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
## 2024-03-20 ENCOUNTER — OFFICE VISIT (OUTPATIENT)
Dept: PHYSICAL THERAPY | Facility: CLINIC | Age: 39
End: 2024-03-20
Payer: COMMERCIAL

## 2024-03-20 DIAGNOSIS — S99.911A INJURY OF RIGHT ANKLE, INITIAL ENCOUNTER: ICD-10-CM

## 2024-03-20 DIAGNOSIS — S69.92XA LEFT WRIST INJURY, INITIAL ENCOUNTER: Primary | ICD-10-CM

## 2024-03-20 PROCEDURE — 97112 NEUROMUSCULAR REEDUCATION: CPT

## 2024-03-20 PROCEDURE — 97140 MANUAL THERAPY 1/> REGIONS: CPT

## 2024-03-20 PROCEDURE — 97110 THERAPEUTIC EXERCISES: CPT

## 2024-03-20 NOTE — PROGRESS NOTES
"Daily Note     Today's date: 3/20/2024  Patient name: Devin Gao  : 1985  MRN: [id number]  Referring provider: Arley Buckley DO  Dx:   Encounter Diagnosis     ICD-10-CM    1. Left wrist injury, initial encounter  S69.92XA       2. Injury of right ankle, initial encounter  S99.911A           Start Time: 1530          Subjective: Patient notes decreased L wrist & R ankle pain, but increased walking continues to provoke R ankle pain (medial/lateral).       Objective: See treatment diary below      Assessment: Tolerated treatment well. Patient demonstrated fatigue post treatment, exhibited good technique with therapeutic exercises, and would benefit from continued PT      Plan: Continue per plan of care.      Precautions:   Past Medical History:   Diagnosis Date    Closed T12 fracture (HCC)        Date: 3/14/24 2024   2024   Visit # 7 9   6   Manuals 6'       Light right ankle stretching LS-into DF w/ mobs AD  with DF mobs    TC into dorsiflexion with mobilization   Light left wrist range stretching LS + scar mobs ----      Ankle MRE 4 way LS AD (4 Way)   4-way TC           Neuro Re-Ed 15'       Foot intrinsics 30x seated    30x seated    Rows  16# 2x10 L UE #20 20x LUE   #15 20x LUE   Shoulder extension OH 10# 2x10 #12 20x LUE   #10 20x LUE   Toe yoga 30x -----   30x   Towel curls 30x                                       Ther Ex 20'       Wrist flexion/ext stretch 30\"x5 each 30\"x5 each   30\" x 5 each    Active wrist flex/ext GTB 20x GTB 20x   GTB x 20 reps   Active wrist radial/ulnar deviation GTB 20x GTB 20x   GTB x 20 reps    Resisted pronation/supination 4# 20x each, cues for dec speed 20x 2# on foam gripper   20x 4#    Touch downs  6\" 2x10 reps  RLE      ProStretch 5x30\" 5x30\"   30\" x 5 RLE   Reverse curls  #8 at celia 20x LUE   #5 at celia 20x LUE   Gripper  #25 20x      Heel raises 20x on 1/2 foam roll 20x on 1/2 foam roll   10x   Ankle circles 20x CW/CCW ----        Standing " tomasa 2minutes each       Ther Activity                        Gait Training                        Modalities                          Access Code: [email or web address]  URL: [email or web address]  Date: 02/07/2024  Prepared by: Johann Cardoza    Exercises  - Supine Diaphragmatic Breathing  - 2 x daily - 7 x weekly - 2 sets - 10 reps  - Seated Wrist Flexion Stretch  - 2 x daily - 7 x weekly - 4 sets - 20 sec hold  - Seated Wrist Extension Stretch  - 2 x daily - 7 x weekly - 4 sets - 20 sec hold  - Wrist Radial Ulnar Deviation AROM  - 2 x daily - 7 x weekly - 2 sets - 10 reps  - Seated Gripping Towel  - 2 x daily - 7 x weekly - 2 sets - 10 reps  - Supine Ankle Circles  - 2 x daily - 7 x weekly - 2 sets - 10 reps  - Towel Scrunches  - 2 x daily - 7 x weekly - 2 sets - 10 reps

## 2024-03-25 ENCOUNTER — OFFICE VISIT (OUTPATIENT)
Dept: PHYSICAL THERAPY | Facility: CLINIC | Age: 39
End: 2024-03-25
Payer: COMMERCIAL

## 2024-03-25 DIAGNOSIS — S69.92XA LEFT WRIST INJURY, INITIAL ENCOUNTER: Primary | ICD-10-CM

## 2024-03-25 DIAGNOSIS — S99.911A INJURY OF RIGHT ANKLE, INITIAL ENCOUNTER: ICD-10-CM

## 2024-03-25 PROCEDURE — 97110 THERAPEUTIC EXERCISES: CPT | Performed by: PHYSICAL MEDICINE & REHABILITATION

## 2024-03-25 PROCEDURE — 97112 NEUROMUSCULAR REEDUCATION: CPT | Performed by: PHYSICAL MEDICINE & REHABILITATION

## 2024-03-25 NOTE — PROGRESS NOTES
"Daily Note     Today's date: 3/25/2024  Patient name: Devin Gao  : 1985  MRN: [id number]  Referring provider: Arley Buckley DO  Dx:   Encounter Diagnosis     ICD-10-CM    1. Left wrist injury, initial encounter  S69.92XA       2. Injury of right ankle, initial encounter  S99.911A             Start Time: 1015  Stop Time: 1100  Total time in clinic (min): 45 minutes    Subjective: Patient reports that he can feel continued improvement in both his ankle and wrist. Patient notes swelling persists to a lesser degree in his ankle.      Objective: See treatment diary below      Assessment: Tolerated treatment well. Patient demonstrated fatigue post treatment, exhibited good technique with therapeutic exercises, and would benefit from continued PT      Plan: Continue per plan of care.      Precautions:   Past Medical History:   Diagnosis Date   • Closed T12 fracture (HCC)        Date: 3/14/24 2024 2024  2024   Visit # 7 9 10  6   Manuals 6'       Light right ankle stretching LS-into DF w/ mobs AD  with DF mobs  TC  TC into dorsiflexion with mobilization   Light left wrist range stretching LS + scar mobs ----      Ankle MRE 4 way LS AD (4 Way) TC  4-way TC           Neuro Re-Ed 15'       Foot intrinsics 30x seated    30x seated    Rows  16# 2x10 L UE #20 20x LUE #20 20x LUE  #15 20x LUE   Shoulder extension OH 10# 2x10 #12 20x LUE #12 20x LUE  #10 20x LUE   Toe yoga 30x -----   30x   Towel curls 30x                                       Ther Ex 20'       Wrist flexion/ext stretch 30\"x5 each 30\"x5 each 30\"x5 each  30\" x 5 each    Active wrist flex/ext GTB 20x GTB 20x GTB 20x  GTB x 20 reps   Active wrist radial/ulnar deviation GTB 20x GTB 20x GTB 20x  GTB x 20 reps    Resisted pronation/supination 4# 20x each, cues for dec speed 20x 2# on foam gripper 20x 2# on foam gripper  20x 4#    Touch downs  6\" 2x10 reps  RLE 8\" 2x10 reps  RLE     ProStretch 5x30\" 5x30\" 5x30\"  30\" x 5 RLE   Reverse " curls  #8 at celia 20x LUE #8 at celia 20x LUE  #5 at celia 20x LUE   Gripper  #25 20x #25 20x     Heel raises 20x on 1/2 foam roll 20x on 1/2 foam roll 20x on 1/2 foam roll  10x   Ankle circles 20x CW/CCW ----        Standing rockerboard 2minutes each  Standing rockerboard 2minutes each      Ther Activity                        Gait Training                        Modalities                          Access Code: [email or web address]  URL: [email or web address]  Date: 02/07/2024  Prepared by: Johann Cardoza    Exercises  - Supine Diaphragmatic Breathing  - 2 x daily - 7 x weekly - 2 sets - 10 reps  - Seated Wrist Flexion Stretch  - 2 x daily - 7 x weekly - 4 sets - 20 sec hold  - Seated Wrist Extension Stretch  - 2 x daily - 7 x weekly - 4 sets - 20 sec hold  - Wrist Radial Ulnar Deviation AROM  - 2 x daily - 7 x weekly - 2 sets - 10 reps  - Seated Gripping Towel  - 2 x daily - 7 x weekly - 2 sets - 10 reps  - Supine Ankle Circles  - 2 x daily - 7 x weekly - 2 sets - 10 reps  - Towel Scrunches  - 2 x daily - 7 x weekly - 2 sets - 10 reps

## 2024-03-27 ENCOUNTER — OFFICE VISIT (OUTPATIENT)
Dept: PHYSICAL THERAPY | Facility: CLINIC | Age: 39
End: 2024-03-27
Payer: COMMERCIAL

## 2024-03-27 DIAGNOSIS — S69.92XA LEFT WRIST INJURY, INITIAL ENCOUNTER: Primary | ICD-10-CM

## 2024-03-27 DIAGNOSIS — S99.911A INJURY OF RIGHT ANKLE, INITIAL ENCOUNTER: ICD-10-CM

## 2024-03-27 PROCEDURE — 97112 NEUROMUSCULAR REEDUCATION: CPT | Performed by: PHYSICAL MEDICINE & REHABILITATION

## 2024-03-27 PROCEDURE — 97110 THERAPEUTIC EXERCISES: CPT | Performed by: PHYSICAL MEDICINE & REHABILITATION

## 2024-03-27 PROCEDURE — 97140 MANUAL THERAPY 1/> REGIONS: CPT | Performed by: PHYSICAL MEDICINE & REHABILITATION

## 2024-03-27 NOTE — PROGRESS NOTES
"Daily Note     Today's date: 3/27/2024  Patient name: Devin Gao  : 1985  MRN: [id number]  Referring provider: Arley Buckley DO  Dx:   Encounter Diagnosis     ICD-10-CM    1. Left wrist injury, initial encounter  S69.92XA       2. Injury of right ankle, initial encounter  S99.911A             Start Time: 1530  Stop Time: 1615  Total time in clinic (min): 45 minutes    Subjective: Patient notes that he will be getting an EKG tomorrow. Patient states overall he continues to improve, however does have some difficulties with stair negotiation.      Objective: See treatment diary below      Assessment: Tolerated treatment well. Discussed tapering down to 1x per week as patient has progressed quite well and shows independence with HEP. Patient demonstrated fatigue post treatment, exhibited good technique with therapeutic exercises, and would benefit from continued PT      Plan: Continue per plan of care.      Precautions:   Past Medical History:   Diagnosis Date   • Closed T12 fracture (HCC)        Date: 3/14/24 2024 2024 2024 2024   Visit # 7 9 10 11 6   Manuals 6'       Light right ankle stretching LS-into DF w/ mobs AD  with DF mobs  TC TC TC into dorsiflexion with mobilization   Light left wrist range stretching LS + scar mobs ----      Ankle MRE 4 way LS AD (4 Way) TC TC 4-way TC           Neuro Re-Ed 15'       Foot intrinsics 30x seated    30x seated    Rows  16# 2x10 L UE #20 20x LUE #20 20x LUE #20 20x LUE #15 20x LUE   Shoulder extension OH 10# 2x10 #12 20x LUE #12 20x LUE #12 20x LUE #10 20x LUE   Toe yoga 30x -----   30x   Towel curls 30x       SLS on beam    With cone taps 20x                            Ther Ex 20'       Wrist flexion/ext stretch 30\"x5 each 30\"x5 each 30\"x5 each 30\"x5 each 30\" x 5 each    Active wrist flex/ext GTB 20x GTB 20x GTB 20x GTB 20x GTB x 20 reps   Active wrist radial/ulnar deviation GTB 20x GTB 20x GTB 20x GTB 20x GTB x 20 reps    Resisted " "pronation/supination 4# 20x each, cues for dec speed 20x 2# on foam gripper 20x 2# on foam gripper 20x 2# on foam gripper 20x 4#    Touch downs  6\" 2x10 reps  RLE 8\" 2x10 reps  RLE 8\" 2x10 reps  RLE    ProStretch 5x30\" 5x30\" 5x30\" 5x30\" 30\" x 5 RLE   Reverse curls  #8 at celia 20x LUE #8 at celia 20x LUE #8 at celia 20x LUE #5 at celia 20x LUE   Gripper  #25 20x #25 20x #25 20x    Heel raises 20x on 1/2 foam roll 20x on 1/2 foam roll 20x on 1/2 foam roll 20x on 1/2 foam roll 10x   Ankle circles 20x CW/CCW ----        Standing rockerboard 2minutes each  Standing rockerboard 2minutes each  Standing rockerboard 2minutes each     Ther Activity                        Gait Training                        Modalities                          Access Code: [email or web address]  URL: [email or web address]  Date: 02/07/2024  Prepared by: Johann Cardoza    Exercises  - Supine Diaphragmatic Breathing  - 2 x daily - 7 x weekly - 2 sets - 10 reps  - Seated Wrist Flexion Stretch  - 2 x daily - 7 x weekly - 4 sets - 20 sec hold  - Seated Wrist Extension Stretch  - 2 x daily - 7 x weekly - 4 sets - 20 sec hold  - Wrist Radial Ulnar Deviation AROM  - 2 x daily - 7 x weekly - 2 sets - 10 reps  - Seated Gripping Towel  - 2 x daily - 7 x weekly - 2 sets - 10 reps  - Supine Ankle Circles  - 2 x daily - 7 x weekly - 2 sets - 10 reps  - Towel Scrunches  - 2 x daily - 7 x weekly - 2 sets - 10 reps         "

## 2024-04-03 ENCOUNTER — OFFICE VISIT (OUTPATIENT)
Dept: PHYSICAL THERAPY | Facility: CLINIC | Age: 39
End: 2024-04-03
Payer: COMMERCIAL

## 2024-04-03 DIAGNOSIS — S69.92XA LEFT WRIST INJURY, INITIAL ENCOUNTER: Primary | ICD-10-CM

## 2024-04-03 DIAGNOSIS — S99.911A INJURY OF RIGHT ANKLE, INITIAL ENCOUNTER: ICD-10-CM

## 2024-04-03 PROCEDURE — 97112 NEUROMUSCULAR REEDUCATION: CPT | Performed by: PHYSICAL MEDICINE & REHABILITATION

## 2024-04-03 PROCEDURE — 97110 THERAPEUTIC EXERCISES: CPT | Performed by: PHYSICAL MEDICINE & REHABILITATION

## 2024-04-03 PROCEDURE — 97140 MANUAL THERAPY 1/> REGIONS: CPT | Performed by: PHYSICAL MEDICINE & REHABILITATION
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
## 2024-04-10 ENCOUNTER — APPOINTMENT (OUTPATIENT)
Dept: PHYSICAL THERAPY | Facility: CLINIC | Age: 39
End: 2024-04-10
Payer: COMMERCIAL

## 2024-04-22 ENCOUNTER — APPOINTMENT (OUTPATIENT)
Dept: RADIOLOGY | Facility: AMBULARY SURGERY CENTER | Age: 39
End: 2024-04-22
Attending: STUDENT IN AN ORGANIZED HEALTH CARE EDUCATION/TRAINING PROGRAM
Payer: COMMERCIAL

## 2024-04-22 VITALS — WEIGHT: 181.9 LBS | HEIGHT: 71 IN | BODY MASS INDEX: 25.47 KG/M2

## 2024-04-22 DIAGNOSIS — S69.92XA LEFT WRIST INJURY, INITIAL ENCOUNTER: ICD-10-CM

## 2024-04-22 DIAGNOSIS — M79.671 RIGHT FOOT PAIN: ICD-10-CM

## 2024-04-22 DIAGNOSIS — S69.92XA LEFT WRIST INJURY, INITIAL ENCOUNTER: Primary | ICD-10-CM

## 2024-04-22 PROCEDURE — 73630 X-RAY EXAM OF FOOT: CPT

## 2024-04-22 PROCEDURE — 99213 OFFICE O/P EST LOW 20 MIN: CPT | Performed by: STUDENT IN AN ORGANIZED HEALTH CARE EDUCATION/TRAINING PROGRAM

## 2024-04-22 PROCEDURE — 73110 X-RAY EXAM OF WRIST: CPT
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
## 2024-04-22 NOTE — LETTER
April 22, 2024     Patient: Devin Gao  YOB: 1985  Date of Visit: 4/22/2024      To Whom it May Concern:    Devin Gao is under my professional care. Devin was seen in my office on 4/22/2024. Devin may return to work on with no restrictions .    If you have any questions or concerns, please don't hesitate to call.         Sincerely,          Arley Buckley DO        CC: No Recipients